# Patient Record
Sex: MALE | Race: WHITE | NOT HISPANIC OR LATINO | Employment: OTHER | URBAN - METROPOLITAN AREA
[De-identification: names, ages, dates, MRNs, and addresses within clinical notes are randomized per-mention and may not be internally consistent; named-entity substitution may affect disease eponyms.]

---

## 2021-02-01 DIAGNOSIS — M54.50 LOW BACK PAIN RADIATING TO RIGHT LEG: Primary | ICD-10-CM

## 2021-02-01 DIAGNOSIS — M79.604 LOW BACK PAIN RADIATING TO RIGHT LEG: Primary | ICD-10-CM

## 2022-02-21 ENCOUNTER — HOSPITAL ENCOUNTER (OUTPATIENT)
Facility: HOSPITAL | Age: 72
Discharge: NURSING, REHAB OR LONG-TERM CARE | End: 2022-02-24
Attending: EMERGENCY MEDICINE | Admitting: HOSPITALIST
Payer: MEDICARE

## 2022-02-21 ENCOUNTER — HOSPITAL ENCOUNTER (EMERGENCY)
Facility: HOSPITAL | Age: 72
Discharge: OTHER INSTITUTION | End: 2022-02-21
Attending: INTERNAL MEDICINE
Payer: MEDICARE

## 2022-02-21 VITALS
TEMPERATURE: 99 F | OXYGEN SATURATION: 98 % | HEART RATE: 96 BPM | RESPIRATION RATE: 20 BRPM | BODY MASS INDEX: 42.44 KG/M2 | HEIGHT: 68 IN | DIASTOLIC BLOOD PRESSURE: 72 MMHG | WEIGHT: 280 LBS | SYSTOLIC BLOOD PRESSURE: 137 MMHG
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
## 2022-02-21 DIAGNOSIS — S61.313A LACERATION OF LEFT MIDDLE FINGER WITHOUT FOREIGN BODY WITH DAMAGE TO NAIL, INITIAL ENCOUNTER: ICD-10-CM

## 2022-02-21 DIAGNOSIS — M25.559 HIP PAIN: ICD-10-CM

## 2022-02-21 DIAGNOSIS — S61.215A LACERATION OF LEFT RING FINGER, FOREIGN BODY PRESENCE UNSPECIFIED, NAIL DAMAGE STATUS UNSPECIFIED, INITIAL ENCOUNTER: Primary | ICD-10-CM

## 2022-02-21 DIAGNOSIS — S62.633B OPEN DISPLACED FRACTURE OF DISTAL PHALANX OF LEFT MIDDLE FINGER, INITIAL ENCOUNTER: Primary | ICD-10-CM

## 2022-02-21 DIAGNOSIS — S68.113A AMPUTATION OF LEFT MIDDLE FINGER: ICD-10-CM

## 2022-02-21 PROBLEM — S61.213A LACERATION OF LEFT MIDDLE FINGER WITHOUT FOREIGN BODY: Status: ACTIVE | Noted: 2022-02-21

## 2022-02-21 LAB
ALBUMIN SERPL BCP-MCNC: 2.9 G/DL (ref 3.5–5.2)
ALP SERPL-CCNC: 77 U/L (ref 55–135)
ALT SERPL W/O P-5'-P-CCNC: 33 U/L (ref 10–44)
ANION GAP SERPL CALC-SCNC: 14 MMOL/L (ref 8–16)
AST SERPL-CCNC: 88 U/L (ref 10–40)
BACTERIA #/AREA URNS HPF: ABNORMAL /HPF
BASOPHILS # BLD AUTO: 0.07 K/UL (ref 0–0.2)
BASOPHILS NFR BLD: 0.9 % (ref 0–1.9)
BILIRUB SERPL-MCNC: 3.4 MG/DL (ref 0.1–1)
BILIRUB UR QL STRIP: ABNORMAL
BUN SERPL-MCNC: 10 MG/DL (ref 8–23)
CALCIUM SERPL-MCNC: 8.4 MG/DL (ref 8.7–10.5)
CHLORIDE SERPL-SCNC: 110 MMOL/L (ref 95–110)
CK SERPL-CCNC: 344 U/L (ref 20–200)
CLARITY UR: CLEAR
CO2 SERPL-SCNC: 21 MMOL/L (ref 23–29)
COLOR UR: ABNORMAL
CREAT SERPL-MCNC: 0.9 MG/DL (ref 0.5–1.4)
DIFFERENTIAL METHOD: ABNORMAL
EOSINOPHIL # BLD AUTO: 0 K/UL (ref 0–0.5)
EOSINOPHIL NFR BLD: 0.1 % (ref 0–8)
ERYTHROCYTE [DISTWIDTH] IN BLOOD BY AUTOMATED COUNT: 13.2 % (ref 11.5–14.5)
EST. GFR  (AFRICAN AMERICAN): >60 ML/MIN/1.73 M^2
EST. GFR  (NON AFRICAN AMERICAN): >60 ML/MIN/1.73 M^2
GLUCOSE SERPL-MCNC: 126 MG/DL (ref 70–110)
GLUCOSE UR QL STRIP: NEGATIVE
HCT VFR BLD AUTO: 36.2 % (ref 40–54)
HGB BLD-MCNC: 12.4 G/DL (ref 14–18)
HGB UR QL STRIP: ABNORMAL
HYALINE CASTS #/AREA URNS LPF: 2 /LPF
IMM GRANULOCYTES # BLD AUTO: 0.02 K/UL (ref 0–0.04)
IMM GRANULOCYTES NFR BLD AUTO: 0.2 % (ref 0–0.5)
KETONES UR QL STRIP: NEGATIVE
LEUKOCYTE ESTERASE UR QL STRIP: NEGATIVE
LYMPHOCYTES # BLD AUTO: 0.6 K/UL (ref 1–4.8)
LYMPHOCYTES NFR BLD: 7.3 % (ref 18–48)
MAGNESIUM SERPL-MCNC: 1.8 MG/DL (ref 1.6–2.6)
MCH RBC QN AUTO: 35.8 PG (ref 27–31)
MCHC RBC AUTO-ENTMCNC: 34.3 G/DL (ref 32–36)
MCV RBC AUTO: 105 FL (ref 82–98)
MICROSCOPIC COMMENT: ABNORMAL
MONOCYTES # BLD AUTO: 0.5 K/UL (ref 0.3–1)
MONOCYTES NFR BLD: 6.7 % (ref 4–15)
NEUTROPHILS # BLD AUTO: 6.8 K/UL (ref 1.8–7.7)
NEUTROPHILS NFR BLD: 84.8 % (ref 38–73)
NITRITE UR QL STRIP: NEGATIVE
NRBC BLD-RTO: 0 /100 WBC
PH UR STRIP: 7 [PH] (ref 5–8)
PLATELET # BLD AUTO: 59 K/UL (ref 150–450)
PMV BLD AUTO: 10.7 FL (ref 9.2–12.9)
POTASSIUM SERPL-SCNC: 4.1 MMOL/L (ref 3.5–5.1)
PROT SERPL-MCNC: 6.8 G/DL (ref 6–8.4)
PROT UR QL STRIP: ABNORMAL
RBC # BLD AUTO: 3.46 M/UL (ref 4.6–6.2)
RBC #/AREA URNS HPF: 1 /HPF (ref 0–4)
SARS-COV-2 RDRP RESP QL NAA+PROBE: NEGATIVE
SODIUM SERPL-SCNC: 145 MMOL/L (ref 136–145)
SP GR UR STRIP: 1.01 (ref 1–1.03)
SQUAMOUS #/AREA URNS HPF: 3 /HPF
URN SPEC COLLECT METH UR: ABNORMAL
UROBILINOGEN UR STRIP-ACNC: ABNORMAL EU/DL
WBC # BLD AUTO: 8.04 K/UL (ref 3.9–12.7)
WBC #/AREA URNS HPF: 1 /HPF (ref 0–5)

## 2022-02-21 PROCEDURE — 70450 CT HEAD/BRAIN W/O DYE: CPT | Mod: TC

## 2022-02-21 PROCEDURE — 96365 THER/PROPH/DIAG IV INF INIT: CPT

## 2022-02-21 PROCEDURE — U0002 COVID-19 LAB TEST NON-CDC: HCPCS | Performed by: INTERNAL MEDICINE

## 2022-02-21 PROCEDURE — 25000003 PHARM REV CODE 250: Performed by: STUDENT IN AN ORGANIZED HEALTH CARE EDUCATION/TRAINING PROGRAM

## 2022-02-21 PROCEDURE — 72125 CT CERVICAL SPINE WITHOUT CONTRAST: ICD-10-PCS | Mod: 26,,, | Performed by: RADIOLOGY

## 2022-02-21 PROCEDURE — 63600175 PHARM REV CODE 636 W HCPCS: Performed by: INTERNAL MEDICINE

## 2022-02-21 PROCEDURE — 82550 ASSAY OF CK (CPK): CPT

## 2022-02-21 PROCEDURE — 72125 CT NECK SPINE W/O DYE: CPT | Mod: 26,,, | Performed by: RADIOLOGY

## 2022-02-21 PROCEDURE — 71045 XR CHEST AP PORTABLE: ICD-10-PCS | Mod: 26,,, | Performed by: RADIOLOGY

## 2022-02-21 PROCEDURE — 85025 COMPLETE CBC W/AUTO DIFF WBC: CPT | Performed by: INTERNAL MEDICINE

## 2022-02-21 PROCEDURE — 93005 ELECTROCARDIOGRAM TRACING: CPT

## 2022-02-21 PROCEDURE — 99285 EMERGENCY DEPT VISIT HI MDM: CPT | Mod: 25

## 2022-02-21 PROCEDURE — 73130 XR HAND COMPLETE 3 VIEW LEFT: ICD-10-PCS | Mod: 26,LT,, | Performed by: RADIOLOGY

## 2022-02-21 PROCEDURE — 72125 CT NECK SPINE W/O DYE: CPT | Mod: TC

## 2022-02-21 PROCEDURE — 12002 RPR S/N/AX/GEN/TRNK2.6-7.5CM: CPT

## 2022-02-21 PROCEDURE — 36415 COLL VENOUS BLD VENIPUNCTURE: CPT | Performed by: INTERNAL MEDICINE

## 2022-02-21 PROCEDURE — 80053 COMPREHEN METABOLIC PANEL: CPT | Performed by: INTERNAL MEDICINE

## 2022-02-21 PROCEDURE — 63600175 PHARM REV CODE 636 W HCPCS: Performed by: STUDENT IN AN ORGANIZED HEALTH CARE EDUCATION/TRAINING PROGRAM

## 2022-02-21 PROCEDURE — 93010 ELECTROCARDIOGRAM REPORT: CPT | Mod: ,,, | Performed by: INTERNAL MEDICINE

## 2022-02-21 PROCEDURE — 81000 URINALYSIS NONAUTO W/SCOPE: CPT | Performed by: INTERNAL MEDICINE

## 2022-02-21 PROCEDURE — 96365 THER/PROPH/DIAG IV INF INIT: CPT | Mod: 59

## 2022-02-21 PROCEDURE — 25000003 PHARM REV CODE 250

## 2022-02-21 PROCEDURE — 11730 AVULSION NAIL PLATE SIMPLE 1: CPT | Mod: F2

## 2022-02-21 PROCEDURE — 71045 X-RAY EXAM CHEST 1 VIEW: CPT | Mod: 26,,, | Performed by: RADIOLOGY

## 2022-02-21 PROCEDURE — 25000003 PHARM REV CODE 250: Performed by: INTERNAL MEDICINE

## 2022-02-21 PROCEDURE — 83735 ASSAY OF MAGNESIUM: CPT | Performed by: INTERNAL MEDICINE

## 2022-02-21 PROCEDURE — 70450 CT HEAD/BRAIN W/O DYE: CPT | Mod: 26,,, | Performed by: RADIOLOGY

## 2022-02-21 PROCEDURE — 99285 EMERGENCY DEPT VISIT HI MDM: CPT | Mod: 25,27

## 2022-02-21 PROCEDURE — 71045 X-RAY EXAM CHEST 1 VIEW: CPT | Mod: TC,FY

## 2022-02-21 PROCEDURE — 63600175 PHARM REV CODE 636 W HCPCS

## 2022-02-21 PROCEDURE — 87040 BLOOD CULTURE FOR BACTERIA: CPT | Performed by: INTERNAL MEDICINE

## 2022-02-21 PROCEDURE — 99285 PR EMERGENCY DEPT VISIT,LEVEL V: ICD-10-PCS | Mod: ,,, | Performed by: EMERGENCY MEDICINE

## 2022-02-21 PROCEDURE — 73130 X-RAY EXAM OF HAND: CPT | Mod: TC,FY,LT

## 2022-02-21 PROCEDURE — G0378 HOSPITAL OBSERVATION PER HR: HCPCS

## 2022-02-21 PROCEDURE — 96375 TX/PRO/DX INJ NEW DRUG ADDON: CPT

## 2022-02-21 PROCEDURE — 96361 HYDRATE IV INFUSION ADD-ON: CPT

## 2022-02-21 PROCEDURE — 70450 CT HEAD WITHOUT CONTRAST: ICD-10-PCS | Mod: 26,,, | Performed by: RADIOLOGY

## 2022-02-21 PROCEDURE — 94760 N-INVAS EAR/PLS OXIMETRY 1: CPT

## 2022-02-21 PROCEDURE — 99285 EMERGENCY DEPT VISIT HI MDM: CPT | Mod: ,,, | Performed by: EMERGENCY MEDICINE

## 2022-02-21 PROCEDURE — 73130 X-RAY EXAM OF HAND: CPT | Mod: 26,LT,, | Performed by: RADIOLOGY

## 2022-02-21 PROCEDURE — 93010 EKG 12-LEAD: ICD-10-PCS | Mod: ,,, | Performed by: INTERNAL MEDICINE

## 2022-02-21 RX ORDER — CEFAZOLIN SODIUM 1 G/50ML
1 SOLUTION INTRAVENOUS
Status: COMPLETED | OUTPATIENT
Start: 2022-02-21 | End: 2022-02-21

## 2022-02-21 RX ORDER — LIDOCAINE HYDROCHLORIDE 10 MG/ML
20 INJECTION INFILTRATION; PERINEURAL ONCE
Status: COMPLETED | OUTPATIENT
Start: 2022-02-21 | End: 2022-02-21

## 2022-02-21 RX ORDER — CEFAZOLIN SODIUM 1 G/3ML
1 INJECTION, POWDER, FOR SOLUTION INTRAMUSCULAR; INTRAVENOUS
Status: DISCONTINUED | OUTPATIENT
Start: 2022-02-21 | End: 2022-02-21

## 2022-02-21 RX ORDER — ONDANSETRON 2 MG/ML
4 INJECTION INTRAMUSCULAR; INTRAVENOUS
Status: COMPLETED | OUTPATIENT
Start: 2022-02-21 | End: 2022-02-21

## 2022-02-21 RX ORDER — MORPHINE SULFATE 4 MG/ML
2 INJECTION, SOLUTION INTRAMUSCULAR; INTRAVENOUS
Status: COMPLETED | OUTPATIENT
Start: 2022-02-21 | End: 2022-02-21

## 2022-02-21 RX ORDER — SULFAMETHOXAZOLE AND TRIMETHOPRIM 800; 160 MG/1; MG/1
1 TABLET ORAL 2 TIMES DAILY
Qty: 28 TABLET | Refills: 0 | Status: SHIPPED | OUTPATIENT
Start: 2022-02-21 | End: 2022-02-24 | Stop reason: SDUPTHER

## 2022-02-21 RX ORDER — DIAZEPAM 5 MG/1
5 TABLET ORAL
Status: COMPLETED | OUTPATIENT
Start: 2022-02-21 | End: 2022-02-21

## 2022-02-21 RX ADMIN — SODIUM CHLORIDE, SODIUM LACTATE, POTASSIUM CHLORIDE, AND CALCIUM CHLORIDE 1000 ML: .6; .31; .03; .02 INJECTION, SOLUTION INTRAVENOUS at 10:02

## 2022-02-21 RX ADMIN — BACITRACIN, NEOMYCIN, POLYMYXIN B 1 EACH: 400; 3.5; 5 OINTMENT TOPICAL at 01:02

## 2022-02-21 RX ADMIN — CEFAZOLIN SODIUM 1 G: 1 SOLUTION INTRAVENOUS at 05:02

## 2022-02-21 RX ADMIN — CEFAZOLIN SODIUM 1 G: 1 SOLUTION INTRAVENOUS at 02:02

## 2022-02-21 RX ADMIN — ONDANSETRON 4 MG: 2 INJECTION INTRAMUSCULAR; INTRAVENOUS at 03:02

## 2022-02-21 RX ADMIN — LIDOCAINE HYDROCHLORIDE 20 ML: 10 INJECTION, SOLUTION INFILTRATION; PERINEURAL at 06:02

## 2022-02-21 RX ADMIN — MORPHINE SULFATE 2 MG: 4 INJECTION INTRAVENOUS at 03:02

## 2022-02-21 RX ADMIN — DIAZEPAM 5 MG: 5 TABLET ORAL at 07:02

## 2022-02-21 NOTE — ED NOTES
Patient arrives from another [facility] hospital with CC of fall. Patient had a fall last night and was unable to get up for several hours. Patient is complaining of back pain as well as has an injury to the middle finger of the left hand.

## 2022-02-21 NOTE — ED PROVIDER NOTES
Encounter Date: 2/21/2022     History     Chief Complaint   Patient presents with    Transfer     From [facility] for hand laceration.      71 year old male with HTN transferred from [place] ED for left hand laceration. Pt states he got up early this morning to go floundering when he tripped over a chair while walking through his kitchen. States his left hand when through the chair which is held together by metal pieces and nails. Cut his finger on the nails/chair fragments. Reports he was unable to get himself off of the floor after a couple of attempts so laid there for 7 hours. Eventually realized his phone was within reach, at which point he called his neighbor who called EMS. Denies headstrike or LOC. Denies chest pain, SOB, or palpitations prior to the fall. Denies lightheadedness/dizziness prior to falling. Denies abdominal pain, nausea, or vomiting. Denies fevers or infective symptoms. States he only drinks socially, however he has had visitors recently so has been drinking 4x beers daily for 1.5 weeks. Transferred for review by orthopedics/hand surgeons.         Review of patient's allergies indicates:  No Known Allergies  Past Medical History:   Diagnosis Date    Herniated lumbar intervertebral disc     Hypertension      No past surgical history on file.  No family history on file.  Social History     Tobacco Use    Smoking status: Never Smoker    Smokeless tobacco: Never Used   Substance Use Topics    Alcohol use: Yes    Drug use: Never     Review of Systems   Constitutional: Negative for fatigue and fever.   HENT: Negative.    Eyes: Negative.    Respiratory: Negative for cough, shortness of breath and wheezing.    Cardiovascular: Negative for chest pain and palpitations.   Gastrointestinal: Negative for abdominal pain, nausea and vomiting.   Genitourinary: Negative for dysuria and hematuria.   Skin: Positive for wound.   Neurological: Positive for tremors and weakness (generalized). Negative for  dizziness, light-headedness and headaches.   Psychiatric/Behavioral: The patient is nervous/anxious.        Physical Exam     Initial Vitals [02/21/22 1656]   BP Pulse Resp Temp SpO2   136/66 96 16 98.5 °F (36.9 °C) 100 %      MAP       --         Physical Exam    Nursing note and vitals reviewed.  Constitutional: He appears well-developed.   HENT:   Head: Normocephalic and atraumatic.   Eyes: EOM are normal. Pupils are equal, round, and reactive to light.   Cardiovascular: Normal rate and regular rhythm.   Murmur (ejection systolic murmur) heard.  Pulmonary/Chest: He has no wheezes. He has no rhonchi. He has no rales.   Abdominal: Abdomen is soft. Bowel sounds are normal. He exhibits no distension. There is no abdominal tenderness.   Musculoskeletal:         General: Edema (BL LL to ankles) present. No tenderness.      Left hand: Deformity and laceration present. Normal range of motion. Normal sensation. Normal capillary refill. Normal pulse.      Comments: Wound to tip of 3rd digit of left hand  Amputation of pulp of finger  No active bleeding  Sensation intact  Power and tendons intact  Normal pulse and cap refill     Neurological: He is alert and oriented to person, place, and time. He has normal strength and normal reflexes. He displays tremor. No cranial nerve deficit or sensory deficit. GCS eye subscore is 4. GCS verbal subscore is 5. GCS motor subscore is 6.   Skin: Skin is warm. Capillary refill takes less than 2 seconds.       ED Course   Procedures  Labs Reviewed   CK - Abnormal; Notable for the following components:       Result Value     (*)     All other components within normal limits          Imaging Results    None          Medications   ceFAZolin (ANCEF) 1 gram in dextrose 5 % 50 mL IVPB (premix) (0 g Intravenous Stopped 2/21/22 1851)   LIDOcaine HCL 10 mg/ml (1%) injection 20 mL (20 mLs Other Given 2/21/22 1815)   diazePAM tablet 5 mg (5 mg Oral Given 2/21/22 1942)     Medical Decision  Making:   History:   Old Medical Records: I decided to obtain old medical records.  Differential Diagnosis:   Traumatic amputation of tip of 3rd digit  Alcohol withdrawal  Femur fracture  Clinical Tests:   Lab Tests: Reviewed  The following lab test(s) were unremarkable: CBC, CMP and Urinalysis  Radiological Study: Reviewed  Medical Tests: Reviewed  ED Management:  72 yo M transferred from [facility] for review by orthopedics of the laceration on his left hand. Reports he tripped over a chair in his kitchen early this morning when he was trying to go floundering. Denies headstrike or LOC. Was unable to stand back up after several attempts so laid on the floor for 7-8 hours. Eventually realized his phone was within reach so called for help. Hemodynamically stable on arrival to ED. Alert and oriented x3. Pt reports feeling nervous. Exam remarkable for amputation of pulp of 3rd digit on left hand; pulses intact, normal sensation and ROM, tendons intact. No active bleeding. Pt does not have the tip of his finger, was unable to find it. Lab work completed at [facility] unremarkable. Normal renal function. UA remarkable for occult blood and urobiliogen, no CPK completed. Will obtain CPK to exclude rhabdomyolysis - mildly elevated 344.  CT head and c-spine unremarkable. XR left hand remarkable for undisplaced fracture. Given tetanus booster at outside hospital. Orthopedics consulted for review of traumatic amputation of tip of left digit. Orthopedics has cleaned the wound, dressed it, and casted his hand. Pt admits to drinking about 4 beers daily for the past 1.5 weeks (had people visiting). Normally does not drink daily. He is feeling anxious and tremulous. Given diazepam 5mg PO for symptom management. Given a dose of IV abx, 1L LR bolus, and tylenol for analgesia. Nursing staff and myself attempted to ambulate with the patient with a walker however he was unable to do so. PT has already left so unable to request a  PT review. Examine remarkable for pain on movement of left leg - XR pelvis ordered to exclude fracture. No fracture seen on x-ray. Disposition is admission for management of potential alcohol withdrawal and physical therapy review due to difficulty with ambulation. On discharge, he will need orthopedic clinic follow-up and will need to complete a 14 day course of bactrim DS BID (prescription already sent to his pharmacy).   Other:   I discussed test(s) with the performing physician.  I have discussed this case with another health care provider.                      Clinical Impression:   Final diagnoses:  [J21.292J] Laceration of left ring finger, foreign body presence unspecified, nail damage status unspecified, initial encounter (Primary)          ED Disposition Condition    Discharge Stable        ED Prescriptions     None        Follow-up Information    None          La Lux MD  Resident  02/22/22 0021

## 2022-02-21 NOTE — ED PROVIDER NOTES
Encounter Date: 2/21/2022       History     Chief Complaint   Patient presents with    Fall     Patient states he fell last, tripped on a chair, at midnight and was on the floor until EMS picked him up.  Patient states he cut his third digit on his left hand.     THE PATIENT STATES HE GOT UP AROUND MIDNIGHT LAST NIGHT TO GET A GLASS OF WATER, HE TRIPPED OVER CHAIR THAT WAS IN THE FLOOR AND FELL DOWN.  HIS LEFT HAND HIT THE CHAIR AND THERE WAS A NAIL ON IT AND IT RIPPED OFF THE LEFT MIDDLE DISTAL FINGER.  THE PATIENT STATES HE WAS UNABLE TO GET UP ON HIS OWN.  HE LAID ON THE FLOOR BUT DENIED ANY LOSS OF CONSCIOUSNESS.  HE STATES HE DID HAVE A BOWEL MOVEMENT BUT HE WAS CONSCIOUS AS WELL.  HE STATES HE HAS BEEN WEAK FOR SEVERAL WEEKS NOW BUT NO DEFINITE ETIOLOGY.        Review of patient's allergies indicates:  No Known Allergies  Past Medical History:   Diagnosis Date    Herniated lumbar intervertebral disc     Hypertension      History reviewed. No pertinent surgical history.  History reviewed. No pertinent family history.  Social History     Tobacco Use    Smoking status: Never Smoker    Smokeless tobacco: Never Used   Substance Use Topics    Alcohol use: Yes    Drug use: Never     Review of Systems   Constitutional: Negative for fever.   HENT: Negative for sore throat.    Respiratory: Negative for shortness of breath.    Cardiovascular: Negative for chest pain.   Gastrointestinal: Negative for nausea.   Genitourinary: Negative for dysuria.   Musculoskeletal: Negative for back pain.   Skin: Negative for rash.   Neurological: Negative for weakness.   Hematological: Does not bruise/bleed easily.       Physical Exam     Initial Vitals [02/21/22 1000]   BP Pulse Resp Temp SpO2   112/61 89 20 98.9 °F (37.2 °C) 98 %      MAP       --         Physical Exam    Constitutional:   ALERT AWARE NO ACUTE DISTRESS APPROPRIATE   Eyes: Conjunctivae and EOM are normal. Pupils are equal, round, and reactive to light.   Neck:  Trachea normal. Neck supple.   Normal range of motion.  Cardiovascular: Regular rhythm, normal heart sounds and normal pulses.   Pulmonary/Chest: Effort normal and breath sounds normal.   Abdominal: Abdomen is soft and flat. Bowel sounds are normal. There is no abdominal tenderness.   Musculoskeletal:      Cervical back: Normal range of motion and neck supple.      Comments: THERE IS COMPLETE AMPUTATION OF THE DISTAL LEFT MIDDLE FINGER.     Neurological: He is alert. He has normal strength and normal reflexes. No cranial nerve deficit or sensory deficit. GCS eye subscore is 4. GCS verbal subscore is 5. GCS motor subscore is 6.   Skin:        DRY ERYTHEMATOUS CHRONIC SKIN CHANGES ON BOTH ANTERIOR SHINS.  NEUROVASCULAR IS AND MOTOR DISTALLY IS NORMAL         ED Course   Procedures  Labs Reviewed   CBC W/ AUTO DIFFERENTIAL - Abnormal; Notable for the following components:       Result Value    RBC 3.46 (*)     Hemoglobin 12.4 (*)     Hematocrit 36.2 (*)      (*)     MCH 35.8 (*)     Platelets 59 (*)     Lymph # 0.6 (*)     Gran % 84.8 (*)     Lymph % 7.3 (*)     All other components within normal limits   COMPREHENSIVE METABOLIC PANEL - Abnormal; Notable for the following components:    CO2 21 (*)     Glucose 126 (*)     Calcium 8.4 (*)     Albumin 2.9 (*)     Total Bilirubin 3.4 (*)     AST 88 (*)     All other components within normal limits   URINALYSIS, REFLEX TO URINE CULTURE - Abnormal; Notable for the following components:    Protein, UA 1+ (*)     Bilirubin (UA) 1+ (*)     Occult Blood UA 1+ (*)     Urobilinogen, UA 2.0-3.0 (*)     All other components within normal limits    Narrative:     Preferred Collection Type->Urine, Clean Catch  Specimen Source->Urine   URINALYSIS MICROSCOPIC - Abnormal; Notable for the following components:    Bacteria Moderate (*)     Hyaline Casts, UA 2 (*)     All other components within normal limits    Narrative:     Preferred Collection Type->Urine, Clean Catch  Specimen  Source->Urine   CULTURE, BLOOD   MAGNESIUM   SARS-COV-2 RNA AMPLIFICATION, QUAL    Narrative:     Is the patient symptomatic?->No        ECG Results          EKG 12-lead (Preliminary result)  Result time 02/21/22 10:49:16    ED Interpretation by Wilfred Johnson MD (02/21/22 10:49:16, [facility], Emergency Medicine)    NORMAL SINUS RHYTHM WITH A RATE OF 91, NO ACUTE ISCHEMIC CHANGES                  In process by Interface, Lab In [facility] (02/21/22 10:48:10)                 Narrative:    Test Reason : W10.8XXA,    Vent. Rate : 091 BPM     Atrial Rate : 091 BPM     P-R Int : 168 ms          QRS Dur : 086 ms      QT Int : 382 ms       P-R-T Axes : 053 -03 057 degrees     QTc Int : 469 ms    Normal sinus rhythm  Normal ECG  No previous ECGs available    Referred By: [email or web address]   SELF           Confirmed By:                             Imaging Results          CT Cervical Spine Without Contrast (Final result)  Result time 02/21/22 12:11:22    Final result by Colin Gregory MD (02/21/22 12:11:22)                 Impression:      1. No acute fracture or subluxation.  2. Mild to moderate multilevel degenerative disc disease resulting in mild central spinal canal stenosis with bilateral neural foraminal narrowing.  3. Suspected right pleural effusion.      Electronically signed by: Colin Gregory  Date:    02/21/2022  Time:    12:11             Narrative:    EXAMINATION:  CT CERVICAL SPINE WITHOUT CONTRAST    CLINICAL HISTORY:  FALL;    TECHNIQUE:  Low dose axial images, sagittal and coronal reformations were performed though the cervical spine.  Contrast was not administered.    COMPARISON:  None    FINDINGS:  The cervical vertebral bodies are normal in height and alignment.  No acute fracture or subluxation.  No significant prevertebral soft tissue swelling.    There is mild to moderate multilevel degenerative disc disease with disc space narrowing and osteophyte formation.  This is most pronounced within  the lower cervical spine resulting in mild central spinal canal stenosis with bilateral neural foraminal narrowing.    Suspected right pleural effusion.  Lung apices otherwise clear.                               CT Head Without Contrast (Final result)  Result time 02/21/22 12:04:45    Final result by Colin Gregory MD (02/21/22 12:04:45)                 Impression:      1. Cortical atrophy with periventricular deep white matter change consistent with chronic small vessel ischemic disease.      Electronically signed by: Colin Gregory  Date:    02/21/2022  Time:    12:04             Narrative:    EXAMINATION:  CT HEAD WITHOUT CONTRAST    CLINICAL HISTORY:  FALL;    TECHNIQUE:  Low dose axial images were obtained through the head.  Coronal and sagittal reformations were also performed. Contrast was not administered.    COMPARISON:  None.    FINDINGS:  There is no acute hemorrhage or infarction.  There is cortical atrophy.  There are periventricular deep white matter changes consistent with chronic small vessel ischemic disease.    No extra-axial fluid collections.  Ventricles are normal in size, shape and configuration.  The basal cisterns are patent.    The imaged paranasal sinuses and ethmoid air cells are well aerated.    The mastoid air cells and middle ears are normally pneumatized.                               X-Ray Hand 3 view Left (Final result)  Result time 02/21/22 11:09:29    Final result by Colin Gregory MD (02/21/22 11:09:29)                 Impression:      1. Soft tissue laceration/partial amputation of the distal 3rd digit with a suspected small adjacent radiopaque foreign body.  2. Nondisplaced fracture involving the distal tuft of the 3rd digit.      Electronically signed by: Colin Gregory  Date:    02/21/2022  Time:    11:09             Narrative:    EXAMINATION:  XR HAND COMPLETE 3 VIEW LEFT    CLINICAL HISTORY:  TRAUMA;.    TECHNIQUE:  PA, lateral, and oblique views of the left hand  were performed.    COMPARISON:  None    FINDINGS:  Soft tissue laceration/partial amputation of the distal 3rd digit.  There is a small adjacent 4 mm radiopaque foreign body.  There is a nondisplaced oblique fracture involving the distal tuft of the distal phalanx of the 3rd digit.    There are mild-to-moderate chronic changes of degenerative osteoarthrosis most predominant at the 1st CMC joint.  Distal radius and ulna intact.                               X-Ray Chest AP Portable (Final result)  Result time 02/21/22 11:06:38    Final result by Colin Gregory MD (02/21/22 11:06:38)                 Impression:      Mild chronic interstitial change without focal consolidation.      Electronically signed by: Colin Gregory  Date:    02/21/2022  Time:    11:06             Narrative:    EXAMINATION:  XR CHEST AP PORTABLE    CLINICAL HISTORY:  Sepsis;    TECHNIQUE:  Single frontal view of the chest was performed.    COMPARISON:  None    FINDINGS:  Mild pulmonary hypoinflation mild crowding of bronchopulmonary vessels.  Mild chronic interstitial change.  No focal consolidation    Heart size is top-normal.  Mediastinal contours unremarkable.  Trachea midline.    Bony thorax intact.                                 Medications   morphine injection 2 mg (has no administration in time range)   ondansetron injection 4 mg (has no administration in time range)   neomycin-bacitracnZn-polymyxnB packet (1 each Topical (Top) Given 2/21/22 1359)   ceFAZolin (ANCEF) 1 gram in dextrose 5 % 50 mL IVPB (premix) (0 g Intravenous Stopped 2/21/22 1444)                 ED Course as of 02/21/22 1519   Mon Feb 21, 2022   1049 EKG 12-lead [PW]   1123 X-Ray Hand 3 view Left [PW]   1123 X-Ray Chest AP Portable [PW]   1205 Patient had no new complaints, GCS and neurologic exam normal [PW]   1247 CT Cervical Spine Without Contrast [PW]   1247 CT Head Without Contrast [PW]   1317 LAB STILL HAS NOT BEEN COLLECTED FOR PATIENT [PW]   1337 X-Ray Hand 3  view Left [PW]   1337 WILL REFER PATIENT TO THE [facility] TRANSFER LINE, THEY WAS SET UP AN OUTPATIENT APPOINTMENT WITH HAND OR PLASTICS [PW]   1401 CBC auto differential(!) [PW]   1401 Urinalysis Microscopic(!) [PW]   1408 ACCEPTED FOR TRANSFER TO [facility] BY DR CASTILLO [facility]. [PW]   1422 Magnesium: 1.8 [PW]   1422 Comprehensive metabolic panel(!)  PATIENT EMS TO [facility] WANTS TO GO BY  EMERGENCY ROOM [PW]      ED Course User Index  [PW] Wilfred Johnson MD             Clinical Impression:   Final diagnoses:  [W10.8XXA] Fall (on) (from) other stairs and steps, initial encounter  [S68.113A] Amputation of left middle finger  [S62.633B] Open displaced fracture of distal phalanx of left middle finger, initial encounter (Primary)          ED Disposition Condition    Transfer to Another Facility Stable              Wilfred Johnson MD  02/21/22 1423       Wilfred Johnson MD  02/21/22 1984

## 2022-02-21 NOTE — ED NOTES
Pt loaded on amr stretcher en route to [facility] [place].

## 2022-02-22 DIAGNOSIS — R52 PAIN: Primary | ICD-10-CM

## 2022-02-22 PROBLEM — I10 HTN (HYPERTENSION): Status: ACTIVE | Noted: 2022-02-22

## 2022-02-22 PROBLEM — Z78.9 ALCOHOL USE: Status: ACTIVE | Noted: 2022-02-22

## 2022-02-22 LAB
ALBUMIN SERPL BCP-MCNC: 2.7 G/DL (ref 3.5–5.2)
ALP SERPL-CCNC: 78 U/L (ref 55–135)
ALT SERPL W/O P-5'-P-CCNC: 26 U/L (ref 10–44)
ANION GAP SERPL CALC-SCNC: 10 MMOL/L (ref 8–16)
AST SERPL-CCNC: 81 U/L (ref 10–40)
BASOPHILS # BLD AUTO: 0.04 K/UL (ref 0–0.2)
BASOPHILS NFR BLD: 0.5 % (ref 0–1.9)
BILIRUB SERPL-MCNC: 4 MG/DL (ref 0.1–1)
BUN SERPL-MCNC: 17 MG/DL (ref 8–23)
CALCIUM SERPL-MCNC: 8.4 MG/DL (ref 8.7–10.5)
CHLORIDE SERPL-SCNC: 107 MMOL/L (ref 95–110)
CO2 SERPL-SCNC: 25 MMOL/L (ref 23–29)
CREAT SERPL-MCNC: 0.9 MG/DL (ref 0.5–1.4)
DIFFERENTIAL METHOD: ABNORMAL
EOSINOPHIL # BLD AUTO: 0 K/UL (ref 0–0.5)
EOSINOPHIL NFR BLD: 0.3 % (ref 0–8)
ERYTHROCYTE [DISTWIDTH] IN BLOOD BY AUTOMATED COUNT: 13.5 % (ref 11.5–14.5)
EST. GFR  (AFRICAN AMERICAN): >60 ML/MIN/1.73 M^2
EST. GFR  (NON AFRICAN AMERICAN): >60 ML/MIN/1.73 M^2
GLUCOSE SERPL-MCNC: 130 MG/DL (ref 70–110)
HCT VFR BLD AUTO: 31.6 % (ref 40–54)
HGB BLD-MCNC: 10.9 G/DL (ref 14–18)
IMM GRANULOCYTES # BLD AUTO: 0.03 K/UL (ref 0–0.04)
IMM GRANULOCYTES NFR BLD AUTO: 0.4 % (ref 0–0.5)
LYMPHOCYTES # BLD AUTO: 1 K/UL (ref 1–4.8)
LYMPHOCYTES NFR BLD: 12.9 % (ref 18–48)
MAGNESIUM SERPL-MCNC: 1.8 MG/DL (ref 1.6–2.6)
MCH RBC QN AUTO: 36.7 PG (ref 27–31)
MCHC RBC AUTO-ENTMCNC: 34.5 G/DL (ref 32–36)
MCV RBC AUTO: 106 FL (ref 82–98)
MONOCYTES # BLD AUTO: 1 K/UL (ref 0.3–1)
MONOCYTES NFR BLD: 12.1 % (ref 4–15)
NEUTROPHILS # BLD AUTO: 5.8 K/UL (ref 1.8–7.7)
NEUTROPHILS NFR BLD: 73.8 % (ref 38–73)
NRBC BLD-RTO: 0 /100 WBC
PHOSPHATE SERPL-MCNC: 3.2 MG/DL (ref 2.7–4.5)
PLATELET # BLD AUTO: 40 K/UL (ref 150–450)
PMV BLD AUTO: 11.8 FL (ref 9.2–12.9)
POTASSIUM SERPL-SCNC: 3.8 MMOL/L (ref 3.5–5.1)
PROT SERPL-MCNC: 6.5 G/DL (ref 6–8.4)
RBC # BLD AUTO: 2.97 M/UL (ref 4.6–6.2)
SODIUM SERPL-SCNC: 142 MMOL/L (ref 136–145)
TSH SERPL DL<=0.005 MIU/L-ACNC: 2.19 UIU/ML (ref 0.4–4)
WBC # BLD AUTO: 7.88 K/UL (ref 3.9–12.7)

## 2022-02-22 PROCEDURE — G0378 HOSPITAL OBSERVATION PER HR: HCPCS

## 2022-02-22 PROCEDURE — 84100 ASSAY OF PHOSPHORUS: CPT | Performed by: INTERNAL MEDICINE

## 2022-02-22 PROCEDURE — 25000003 PHARM REV CODE 250

## 2022-02-22 PROCEDURE — 99219 PR INITIAL OBSERVATION CARE,LEVL II: CPT | Mod: ,,, | Performed by: INTERNAL MEDICINE

## 2022-02-22 PROCEDURE — 96361 HYDRATE IV INFUSION ADD-ON: CPT

## 2022-02-22 PROCEDURE — 97165 OT EVAL LOW COMPLEX 30 MIN: CPT

## 2022-02-22 PROCEDURE — 85025 COMPLETE CBC W/AUTO DIFF WBC: CPT | Performed by: INTERNAL MEDICINE

## 2022-02-22 PROCEDURE — 63600175 PHARM REV CODE 636 W HCPCS: Performed by: INTERNAL MEDICINE

## 2022-02-22 PROCEDURE — 25000003 PHARM REV CODE 250: Performed by: STUDENT IN AN ORGANIZED HEALTH CARE EDUCATION/TRAINING PROGRAM

## 2022-02-22 PROCEDURE — 99219 PR INITIAL OBSERVATION CARE,LEVL II: ICD-10-PCS | Mod: ,,, | Performed by: INTERNAL MEDICINE

## 2022-02-22 PROCEDURE — 80053 COMPREHEN METABOLIC PANEL: CPT | Performed by: INTERNAL MEDICINE

## 2022-02-22 PROCEDURE — 83735 ASSAY OF MAGNESIUM: CPT | Performed by: INTERNAL MEDICINE

## 2022-02-22 PROCEDURE — 97530 THERAPEUTIC ACTIVITIES: CPT

## 2022-02-22 PROCEDURE — 84443 ASSAY THYROID STIM HORMONE: CPT | Performed by: INTERNAL MEDICINE

## 2022-02-22 PROCEDURE — 25000003 PHARM REV CODE 250: Performed by: INTERNAL MEDICINE

## 2022-02-22 PROCEDURE — 97161 PT EVAL LOW COMPLEX 20 MIN: CPT

## 2022-02-22 RX ORDER — ACETAMINOPHEN 325 MG/1
650 TABLET ORAL EVERY 6 HOURS PRN
Status: DISCONTINUED | OUTPATIENT
Start: 2022-02-22 | End: 2022-02-24 | Stop reason: HOSPADM

## 2022-02-22 RX ORDER — LISINOPRIL 20 MG/1
40 TABLET ORAL DAILY
Status: DISCONTINUED | OUTPATIENT
Start: 2022-02-22 | End: 2022-02-24 | Stop reason: HOSPADM

## 2022-02-22 RX ORDER — ASPIRIN 81 MG/1
81 TABLET ORAL DAILY
COMMUNITY
End: 2022-02-24

## 2022-02-22 RX ORDER — NALOXONE HCL 0.4 MG/ML
0.02 VIAL (ML) INJECTION
Status: DISCONTINUED | OUTPATIENT
Start: 2022-02-22 | End: 2022-02-24 | Stop reason: HOSPADM

## 2022-02-22 RX ORDER — THIAMINE HCL 100 MG
100 TABLET ORAL DAILY
Status: DISCONTINUED | OUTPATIENT
Start: 2022-02-22 | End: 2022-02-24 | Stop reason: HOSPADM

## 2022-02-22 RX ORDER — DIAZEPAM 5 MG/1
5 TABLET ORAL EVERY 8 HOURS PRN
Status: DISCONTINUED | OUTPATIENT
Start: 2022-02-22 | End: 2022-02-23

## 2022-02-22 RX ORDER — ACETAMINOPHEN 500 MG
1000 TABLET ORAL
Status: COMPLETED | OUTPATIENT
Start: 2022-02-22 | End: 2022-02-22

## 2022-02-22 RX ORDER — SULFAMETHOXAZOLE AND TRIMETHOPRIM 800; 160 MG/1; MG/1
1 TABLET ORAL 2 TIMES DAILY
Status: DISCONTINUED | OUTPATIENT
Start: 2022-02-22 | End: 2022-02-24 | Stop reason: HOSPADM

## 2022-02-22 RX ORDER — SODIUM CHLORIDE 0.9 % (FLUSH) 0.9 %
5 SYRINGE (ML) INJECTION
Status: DISCONTINUED | OUTPATIENT
Start: 2022-02-22 | End: 2022-02-24 | Stop reason: HOSPADM

## 2022-02-22 RX ORDER — AMLODIPINE BESYLATE 5 MG/1
5 TABLET ORAL EVERY MORNING
COMMUNITY

## 2022-02-22 RX ORDER — ONDANSETRON 2 MG/ML
4 INJECTION INTRAMUSCULAR; INTRAVENOUS EVERY 8 HOURS PRN
Status: DISCONTINUED | OUTPATIENT
Start: 2022-02-22 | End: 2022-02-24 | Stop reason: HOSPADM

## 2022-02-22 RX ORDER — AMLODIPINE BESYLATE 5 MG/1
5 TABLET ORAL DAILY
Status: DISCONTINUED | OUTPATIENT
Start: 2022-02-22 | End: 2022-02-24 | Stop reason: HOSPADM

## 2022-02-22 RX ORDER — FOLIC ACID 1 MG/1
1 TABLET ORAL DAILY
Status: DISCONTINUED | OUTPATIENT
Start: 2022-02-22 | End: 2022-02-24 | Stop reason: HOSPADM

## 2022-02-22 RX ORDER — CHOLECALCIFEROL (VITAMIN D3) 25 MCG
1000 TABLET ORAL DAILY
COMMUNITY

## 2022-02-22 RX ORDER — SULFAMETHOXAZOLE AND TRIMETHOPRIM 400; 80 MG/1; MG/1
1 TABLET ORAL 2 TIMES DAILY
Status: DISCONTINUED | OUTPATIENT
Start: 2022-02-22 | End: 2022-02-22

## 2022-02-22 RX ORDER — LORATADINE 10 MG/1
10 TABLET ORAL DAILY
COMMUNITY

## 2022-02-22 RX ORDER — SODIUM CHLORIDE, SODIUM LACTATE, POTASSIUM CHLORIDE, CALCIUM CHLORIDE 600; 310; 30; 20 MG/100ML; MG/100ML; MG/100ML; MG/100ML
INJECTION, SOLUTION INTRAVENOUS CONTINUOUS
Status: DISCONTINUED | OUTPATIENT
Start: 2022-02-22 | End: 2022-02-22

## 2022-02-22 RX ORDER — BENAZEPRIL HYDROCHLORIDE 40 MG/1
40 TABLET ORAL EVERY MORNING
COMMUNITY

## 2022-02-22 RX ADMIN — AMLODIPINE BESYLATE 5 MG: 5 TABLET ORAL at 08:02

## 2022-02-22 RX ADMIN — FOLIC ACID 1 MG: 1 TABLET ORAL at 08:02

## 2022-02-22 RX ADMIN — SULFAMETHOXAZOLE AND TRIMETHOPRIM 1 TABLET: 800; 160 TABLET ORAL at 08:02

## 2022-02-22 RX ADMIN — DIAZEPAM 5 MG: 5 TABLET ORAL at 03:02

## 2022-02-22 RX ADMIN — SODIUM CHLORIDE, SODIUM LACTATE, POTASSIUM CHLORIDE, AND CALCIUM CHLORIDE: .6; .31; .03; .02 INJECTION, SOLUTION INTRAVENOUS at 03:02

## 2022-02-22 RX ADMIN — DIAZEPAM 5 MG: 5 TABLET ORAL at 07:02

## 2022-02-22 RX ADMIN — Medication 1 TABLET: at 08:02

## 2022-02-22 RX ADMIN — ACETAMINOPHEN 1000 MG: 500 TABLET ORAL at 01:02

## 2022-02-22 RX ADMIN — SULFAMETHOXAZOLE AND TRIMETHOPRIM 1 TABLET: 800; 160 TABLET ORAL at 09:02

## 2022-02-22 RX ADMIN — LISINOPRIL 40 MG: 20 TABLET ORAL at 08:02

## 2022-02-22 RX ADMIN — Medication 100 MG: at 08:02

## 2022-02-22 NOTE — PT/OT/SLP EVAL
Occupational Therapy   Evaluation    Name: Juan Hatch  MRN: [id number]  Admitting Diagnosis:  Laceration of left middle finger without foreign body  Recent Surgery: * No surgery found *      Recommendations:     Discharge Recommendations: nursing facility, skilled  Discharge Equipment Recommendations:  bedside commode, bath bench  Barriers to discharge:  Decreased caregiver support    Assessment:     Juan Hatch is a 71 y.o. male with a medical diagnosis of Laceration of left middle finger without foreign body.  He presents with decreased independence with ADL's. Performance deficits affecting function: weakness, impaired endurance, impaired self care skills, impaired functional mobilty, impaired balance, decreased upper extremity function, decreased lower extremity function, decreased safety awareness, impaired coordination.      Rehab Prognosis: Good; patient would benefit from acute skilled OT services to address these deficits and reach maximum level of function.       Plan:     Patient to be seen 3 x/week to address the above listed problems via self-care/home management, therapeutic activities, therapeutic exercises  · Plan of Care Expires: 03/24/22  · Plan of Care Reviewed with: patient    Subjective   Pt reported that he will not have (A) at home.  Chief Complaint: tremors and anxiety  Patient/Family Comments/goals: none stated    Occupational Profile:  Living Environment: Pt resides in split house with room-mate in 2nd part of house.  Pt's house is 2 story with elevator access to the 2nd floor.  Pt has a bathtub on 1st floor & walk-in shower on 2nd floor. Pt reports independence with all ADL's & ambulation PTA.  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 Pt enjoys fishing, building projects, & reading.  Equipment Used at Home:  wheelchair, walker, rolling, hip kit  Assistance upon Discharge: none    Pain/Comfort:  · Pain Rating 1:  (did not rate)  · Location 1:  (left  hand)  · Pain Addressed 1: Distraction  · Pain Rating Post-Intervention 1:  (did not rate)    Patients cultural, spiritual, Church conflicts given the current situation: no    Objective:     Communicated with: RN prior to session.  Patient found supine with telemetry, pulse ox (continuous) (dressing to left hand) upon OT entry to room.    General Precautions: Standard, fall   Orthopedic Precautions:N/A   Braces: N/A    Occupational Performance:    Bed Mobility:    · Patient completed Supine to Sit with minimum assistance  · Patient completed Sit to Supine with minimum assistance    Functional Mobility/Transfers:  · Patient completed Sit <> Stand Transfer with CGA- Min (A) from EOB  with  no assistive device   · Functional Mobility: CGA-Min (A) with taking steps forward, backward, & to the left along EOB (pt noted to hold onto wall when able)    Activities of Daily Living:  · Upper Body Dressing: moderate assistance donning gown around back while seated EOB  · Lower Body Dressing: total assistance donning socks while seated EOB (pt reported that he uses a sock aid & reach for donning & doffing socks at home)    Cognitive/Visual Perceptual:  Cognitive/Psychosocial Skills:     -       Oriented to: Person, Place, Time and Situation   -       Follows Commands/attention:Follows multistep  commands  -       Safety awareness/insight to disability: intact     Physical Exam:  Sensation:    -       Intact  Dominant hand:    -       right  Upper Extremity Range of Motion:  WFL BUE except limitations due to dressing/splint & injury to left hand/finger  Upper Extremity Strength: WFL BUE (NT for left hand)   Strength: RUE WFL, NT LUE due to injury  Fine Motor Coordination: impaired LUE    AMPAC 6 Click ADL:  AMPAC Total Score: 13    Treatment & Education:  Provided education on safety & need for (A) with OOB activities.  Recommended use of bedside commode while in hospital for toileting needs with (A) from staff.  Provided  education regarding role of OT, POC, & discharge recommendations with pt verbalizing understanding.  Pt had no further questions & when asked whether there were any concerns pt reported none.  Education:    Patient left supine with all lines intact, call button in reach and RN notified    GOALS:   Multidisciplinary Problems     Occupational Therapy Goals        Problem: Occupational Therapy Goal    Goal Priority Disciplines Outcome Interventions   Occupational Therapy Goal     OT, PT/OT Ongoing, Progressing    Description: Goals to be met by: 3/12     Patient will increase functional independence with ADLs by performing:    UE Dressing with Supervision.  LE Dressing with Supervision and Assistive Devices as needed.  Grooming while standing with Supervision.  Toileting from bedside commode with Supervision for hygiene and clothing management.   Supine to sit with Modified [place].  Step transfer with Supervision                     History:     Past Medical History:   Diagnosis Date    Herniated lumbar intervertebral disc     Hypertension        No past surgical history on file.    Time Tracking:     OT Date of Treatment: 02/22/22  OT Start Time: 0914  OT Stop Time: 0932  OT Total Time (min): 18 min    Billable Minutes:Evaluation 10  Therapeutic Activity 8    2/22/2022

## 2022-02-22 NOTE — ED NOTES
Pt very unsteady on feet despite having this RN as well as using a walker to ambulate. Ambulated aprox. 2 steps before pt became unbalanced and almost fell

## 2022-02-22 NOTE — H&P
[facility] - Emergency Dept  [facility]  History & Physical    Patient Name: Juan Hatch  MRN: [id number]  Patient Class: OP- Observation  Admission Date: 2/21/2022  Attending Physician: KERRI Ortiz MD  Primary Care Provider: Selvin Mathews MD         Patient information was obtained from patient and ER records.     Subjective:     Principal Problem:Laceration of left middle finger without foreign body    Chief Complaint:   Chief Complaint   Patient presents with    Transfer     From [place] for hand laceration.         HPI: Mr. Hatch 71-year-old male with a history of hypertension and herniated disc who was transferred to [facility] emergency department from [facility] for a laceration to his left 3rd finger.  He noted he was walking and slipped hitting his left hand on a metal chair.  That occurred  between midnight and 2:00 a.m. on the morning of presentation.  He noted his last alcohol use had been around 8:00 p.m. the night prior.  He had no head trauma and no loss of consciousness with the fall.  He was taken to [facility] where he was found to have evidence of a distal laceration of the finger tip.  He was transferred here for Orthopedic Hand Surgery evaluation.  He underwent repair of the laceration.  Patient reported he received Tdap prior to transfer from [place].  He received antibiotics at the outside facility and Bactrim in the emergency department here.  Of note, he had some tremor and anxiety.  He reports that since around Valentine's he has been drinking 3-4 beers and 1 alcoholic drink per day.  He received a dose of Valium in the emergency department and noted that he felt better.  Hospital Medicine was asked to admit for further treatment.      Past Medical History:   Diagnosis Date    Herniated lumbar intervertebral disc     Hypertension        No past surgical history on file.    Review of patient's allergies indicates:  No Known Allergies    Current  Facility-Administered Medications on File Prior to Encounter   Medication    [COMPLETED] ceFAZolin (ANCEF) 1 gram in dextrose 5 % 50 mL IVPB (premix)    [COMPLETED] morphine injection 2 mg    [COMPLETED] neomycin-bacitracnZn-polymyxnB packet    [COMPLETED] ondansetron injection 4 mg    [DISCONTINUED] ceFAZolin injection 1 g     Current Outpatient Medications on File Prior to Encounter   Medication Sig    amLODIPine (NORVASC) 5 MG tablet Take 5 mg by mouth once daily.    benazepriL (LOTENSIN) 40 MG tablet Take 40 mg by mouth once daily.     Family History    None       Tobacco Use    Smoking status: Never Smoker    Smokeless tobacco: Never Used   Substance and Sexual Activity    Alcohol use: Yes    Drug use: Never    Sexual activity: Not Currently     Review of Systems   Constitutional:  Negative for chills, diaphoresis and fever.   HENT:  Negative for congestion and sore throat.    Eyes:  Negative for photophobia and visual disturbance.   Respiratory:  Negative for cough and shortness of breath.    Cardiovascular:  Negative for chest pain and palpitations.   Gastrointestinal:  Negative for abdominal pain, nausea and vomiting.   Genitourinary:  Negative for dysuria and hematuria.   Musculoskeletal:         Pain in his left hand.   Skin:  Negative for pallor and rash.        Dressed wound to his left hand   Neurological:  Negative for syncope, facial asymmetry, speech difficulty, weakness and headaches.   Psychiatric/Behavioral:  Negative for agitation and confusion.    Objective:     Vital Signs (Most Recent):  Temp: 98.5 °F (36.9 °C) (02/21/22 1656)  Pulse: 108 (02/21/22 2018)  Resp: 19 (02/21/22 1946)  BP: 124/76 (02/21/22 1946)  SpO2: 96 % (02/21/22 2018) Vital Signs (24h Range):  Temp:  [98.5 °F (36.9 °C)-98.9 °F (37.2 °C)] 98.5 °F (36.9 °C)  Pulse:  [] 108  Resp:  [16-27] 19  SpO2:  [95 %-100 %] 96 %  BP: (112-150)/(61-76) 124/76        There is no height or weight on file to calculate  BMI.    Physical Exam  Vitals reviewed.   Constitutional:       General: He is not in acute distress.  HENT:      Head: Normocephalic and atraumatic.      Mouth/Throat:      Mouth: Mucous membranes are dry.      Pharynx: Oropharynx is clear.   Eyes:      Extraocular Movements: Extraocular movements intact.      Conjunctiva/sclera: Conjunctivae normal.   Cardiovascular:      Rate and Rhythm: Normal rate and regular rhythm.   Pulmonary:      Effort: Pulmonary effort is normal. No respiratory distress.      Breath sounds: No wheezing.   Abdominal:      General: Abdomen is flat. Bowel sounds are normal.      Palpations: Abdomen is soft.      Tenderness: There is no abdominal tenderness.      Comments: Small, nontender umbilical hernia   Musculoskeletal:      Cervical back: Neck supple. No tenderness.      Comments: No lower extremity edema peripheral pulses palpable.  Left hand has a postprocedure dressing in place   Skin:     General: Skin is warm and dry.      Comments: Dressed wound to his left hand   Neurological:      General: No focal deficit present.      Mental Status: He is alert and oriented to person, place, and time.      Cranial Nerves: No cranial nerve deficit.      Comments: I did not assess  strength in left hand.  He is able to move both arms and both legs.           Significant Labs: All pertinent labs within the past 24 hours have been reviewed.  Recent Lab Results         02/21/22  1816   02/21/22  1444   02/21/22  1335   02/21/22  1329        Albumin     2.9         Alkaline Phosphatase     77         ALT     33         Anion Gap     14         Appearance, UA       Clear       AST     88         Bacteria, UA       Moderate       Baso #     0.07         Basophil %     0.9         Bilirubin (UA)       1+  Comment: Positive urine bilirubin is not confirmed. Correlate with   serum bilirubin and clinical presentation.         BILIRUBIN TOTAL     3.4  Comment: For infants and newborns, interpretation  of results should be based  on gestational age, weight and in agreement with clinical  observations.    Premature Infant recommended reference ranges:  Up to 24 hours.............<8.0 mg/dL  Up to 48 hours............<12.0 mg/dL  3-5 days..................<15.0 mg/dL  6-29 days.................<15.0 mg/dL           BUN     10         Calcium     8.4         Chloride     110         CO2     21         Color, UA       Aminah                    Creatinine     0.9         Differential Method     Automated         eGFR if      >60.0         eGFR if non      >60.0  Comment: Calculation used to obtain the estimated glomerular filtration  rate (eGFR) is the CKD-EPI equation.            Eos #     0.0         Eosinophil %     0.1         Glucose     126         Glucose, UA       Negative       Gran # (ANC)     6.8         Gran %     84.8         Hematocrit     36.2         Hemoglobin     12.4         Hyaline Casts, UA       2       Immature Grans (Abs)     0.02  Comment: Mild elevation in immature granulocytes is non specific and   can be seen in a variety of conditions including stress response,   acute inflammation, trauma and pregnancy. Correlation with other   laboratory and clinical findings is essential.           Immature Granulocytes     0.2         Ketones, UA       Negative       Leukocytes, UA       Negative       Lymph #     0.6         Lymph %     7.3         Magnesium     1.8         MCH     35.8         MCHC     34.3         MCV     105         Microscopic Comment       SEE COMMENT  Comment: Other formed elements not mentioned in the report are not   present in the microscopic examination.          Mono #     0.5         Mono %     6.7         MPV     10.7         NITRITE UA       Negative       nRBC     0         Occult Blood UA       1+       pH, UA       7.0       Platelets     59         Potassium     4.1         PROTEIN TOTAL     6.8         Protein, UA        1+  Comment: Recommend a 24 hour urine protein or a urine   protein/creatinine ratio if globulin induced proteinuria is  clinically suspected.         RBC     3.46         RBC, UA       1       RDW     13.2         SARS-CoV-2 RNA, Amplification, Qual   Negative  Comment: This test utilizes isothermal nucleic acid amplification   technology to detect the SARS-CoV-2 RdRp nucleic acid segment.   The analytical sensitivity (limit of detection) is 125 genome   equivalents/mL.     A POSITIVE result implies infection with the SARS-CoV-2 virus;  the patient is presumed to be contagious.    A NEGATIVE result means that SARS-CoV-2 nucleic acids are not  present above the limit of detection. A NEGATIVE result should be   treated as presumptive. It does not rule out the possibility of   COVID-19 and should not be the sole basis for treatment decisions.   If COVID-19 is strongly suspected based on clinical and exposure   history, re-testing using an alternate molecular assay should be   considered.       This test is only for use under the Food and Drug   Administration s Emergency Use Authorization (EUA).   Commercial kits are provided by [employer].   Performance characteristics of the EUA have been independently  verified by [facility] Department of  Pathology and Laboratory Medicine.   _________________________________________________________________  The ID NOW COVID-19 Letter of Authorization, along with the   authorized Fact Sheet for Healthcare Providers, the authorized Fact  Sheet for Patients, and authorized labeling are available on the FDA   website:  www.fda.gov/MedicalDevices/Safety/EmergencySituations/hnx524424.htm             Sodium     145         Specific [place], UA       1.015       Specimen UA       Urine, Clean Catch       Squam Epithel, UA       3       UROBILINOGEN UA       2.0-3.0       WBC, UA       1       WBC     8.04                 Significant Imaging: I have reviewed all  pertinent imaging results/findings within the past 24 hours.    CT head and C-spine had no acute abnormalities.    X-rays of his hips had no evidence of acute injury.    Chest x-ray had mild chronic interstitial changes.    X-ray of the left hand had soft tissue laceration/partial amputation of the distal 3rd digit with suspected small adjacent radiopaque foreign body a nondisplaced fracture involving the distal tuft of the 3rd digit    EKG was normal sinus rhythm and appeared to be a normal EKG.    Assessment/Plan:     * Laceration of left middle finger without foreign body  -appreciate Orthopedic Surgery input  -will place on Bactrim  -PT and OT are consulted to evaluate with his fall      Alcohol use  -Valium is ordered as needed for any withdrawal symptoms  -Thiamine, folic acid, multivitamin ordered  -perform CIWA scale and neurologic checks    HTN (hypertension)  -he is on benazepril and amlodipine at home.  Will continue antihypertensives        VTE Risk Mitigation (From admission, onward)         Ordered     Reason for No Pharmacological VTE Prophylaxis  Once        Question:  Reasons:  Answer:  Risk of Bleeding    02/22/22 0108     IP VTE HIGH RISK PATIENT  Once         02/22/22 0108     Place sequential compression device  Until discontinued         02/22/22 0108                   KERRI Ortiz MD  Department of Hospital Medicine   [facility] - Emergency Dept

## 2022-02-22 NOTE — HPI
Mr. Hatch is a 71 year old male with HTN who presents as a transfer to the [facility] ED for left hand laceration. Pt states he sustained a fall and tripped onto a chair and cut his hand open on a metal piece of the chair. He denies head trauma or loss of consciousness.  Denies any pulsatile bleeding or foreign bodies.  He is right hand dominant.  Denies any numbness or tendon injury.

## 2022-02-22 NOTE — ASSESSMENT & PLAN NOTE
-Valium is ordered as needed for any withdrawal symptoms  -Thiamine, folic acid, multivitamin ordered  -perform CIWA scale and neurologic checks

## 2022-02-22 NOTE — PT/OT/SLP EVAL
Physical Therapy Evaluation and Treatment     Patient Name:  Juan Hatch   MRN:  [id number]    Recommendations:     Discharge Recommendations:  nursing facility, skilled   Discharge Equipment Recommendations: bedside commode, bath bench   Barriers to discharge: Decreased caregiver support    Assessment:     Juan Hatch is a 71 y.o. male admitted with a medical diagnosis of Laceration of left middle finger without foreign body.  He presents with the following impairments/functional limitations:  weakness, impaired endurance, impaired self care skills, impaired functional mobilty, gait instability, impaired balance, decreased coordination. Pt evaluated after fall at home. Pt reports following fall, he was unable to get up for 6 hours. Pt demo's significant weakness, as well as tremulous activity throughout body, contributing to mobility deficits. Pt is able to stand and ambulate 3 steps with minimum assistance with use of a RW. At this time, pt presents below functional baseline, is not safe to return home 2/2 risk of falls. Upon discharge, pt would benefit from continued skilled therapy intervention at skilled nursing facility to progress toward more independent mobility. At this time, pt would continue to benefit from acute skilled therapy intervention to address deficits and progress toward prior level of function.       Rehab Prognosis: Good; patient would benefit from acute skilled PT services to address these deficits and reach maximum level of function.    Recent Surgery: * No surgery found *      Plan:     During this hospitalization, patient to be seen 4 x/week to address the identified rehab impairments via therapeutic activities, therapeutic exercises, gait training, neuromuscular re-education and progress toward the following goals:    · Plan of Care Expires:  03/22/22    Subjective     Chief Complaint: Pt reported feeling weak, worried about how he will take care of himself    Patient/Family Comments/goals: to get better and return home   Pain/Comfort:  · Pain Rating 1:  (pt reports pain in L hand at site of injury, did not quantify)  · Pain Addressed 1: Distraction, Pre-medicate for activity  · Pain Rating Post-Intervention 1:  (pt continues to report mild pain)    Patients cultural, spiritual, Restorationism conflicts given the current situation: no    Living Environment:  Pt lives with a friend (93 yo)  In a 2 STH with bed/bath on first floor, elevator to enter.   Prior to admission, patients level of function was independent with mobility and ADLs, occasionally uses RW when back pain flares.  Equipment used at home: wheelchair.  DME owned (not currently used): wheelchair.  Upon discharge, patient will have assistance from friend-however, unable to provide physical assistance.    Objective:     Communicated with RN prior to session.  Patient found HOB elevated with telemetry, pulse ox (continuous), peripheral IV  upon PT entry to room.    General Precautions: Standard, fall   Orthopedic Precautions:N/A   Braces: N/A  Respiratory Status: Room air    Exams:  · Cognitive Exam:  Patient is AAOx4, followed all commands, communicates clearly and fluently  · Gross Motor Coordination:  Impaired 2/2 tremulous activity   · RUE ROM: WFL  · RUE Strength: WFL  · LUE ROM: WFL  · LUE Strength: WFL  · RLE ROM: WFL  · RLE Strength: grossly 3-/5  · LLE ROM: WFL  · LLE Strength: grossly 3-/5    Functional Mobility:  · Bed Mobility:     · Supine to Sit: minimum assistance  · Sit to Supine: minimum assistance  · Transfers:     · Sit to Stand:2x from EOB with  minimum assistance with rolling walker, cuing provided for hand placement and anterior weight shift   Gait: Pt ambulated 3 lateral steps to the R along EOB with RW and minimum assistance. Pt demo'd small step size, decreased foot clearance, narrow PABLO, excessive sway, tremulous movement in body. Facilitation provided for lateral weight shift to promote  step initiation. Assistance provided to advance RW. Cuing provided for forward gaze and upright posture.     Therapeutic Activities and Exercises:   Pt educated on role of PT/POC. Pt verbalized understanding.   Pt encouraged to only perform OOB mobility with assistance from nursing/therapy. Pt agreeable.   PT discussed discharge options, provided education on importance of mobilization to achieve goals. Pt reports reluctance to eat 2/2 embarrassment related to inability to currently perform toileting independently. Pt also believed his IV was providing him with adequate nutrition. PT provided education on importance of nutrition for strength and recovery. Pt demo'd understanding.     AM-PAC 6 CLICK MOBILITY  Total Score:15     Patient left HOB elevated with all lines intact, call button in reach and RN notified.    GOALS:   Multidisciplinary Problems     Physical Therapy Goals        Problem: Physical Therapy Goal    Goal Priority Disciplines Outcome Goal Variances Interventions   Physical Therapy Goal     PT, PT/OT Ongoing, Progressing     Description: Goals to be met by: 3/8/2022     Patient will increase functional independence with mobility by performin. Supine to sit with Set-up [place]  2. Sit to stand transfer with Supervision  3. Bed to chair transfer with Supervision using Rolling Walker  4. Gait  x 100 feet with Supervision using Rolling Walker.   5. Stand for 5 minutes with Modified [place] using Rolling Walker while completing self care tasks                      History:     Past Medical History:   Diagnosis Date    Herniated lumbar intervertebral disc     Hypertension        No past surgical history on file.    Time Tracking:     PT Received On: 22  PT Start Time: 844     PT Stop Time: 911  PT Total Time (min): 27 min     Billable Minutes: Evaluation 10 mins  and Therapeutic Activity 17 mins       2022

## 2022-02-22 NOTE — ASSESSMENT & PLAN NOTE
-appreciate Orthopedic Surgery input  -will place on Bactrim  -PT and OT are consulted to evaluate with his fall

## 2022-02-22 NOTE — DISCHARGE INSTRUCTIONS
We have given you a prescription for 14 days of bactrim. Please take this medication twice daily. Orthopedics will follow-up with you in their clinic. Please ensure to keep your wound clean and dry.

## 2022-02-22 NOTE — PHARMACY MED REC
"Admission Medication History     The home medication history was taken by [facility].    You may go to "Admission" then "Reconcile Home Medications" tabs to review and/or act upon these items.      The home medication list has been updated by the Pharmacy department.    Please read ALL comments highlighted in yellow.    Please address this information as you see fit.     Feel free to contact us if you have any questions or require assistance.       Medications listed below were obtained from: Patient    Current Outpatient Medications on File Prior to Encounter   Medication Sig    amLODIPine (NORVASC) 5 MG tablet   Take 5 mg by mouth every morning.    aspirin (ECOTRIN) 81 MG EC tablet   Take 81 mg by mouth once daily.    benazepriL (LOTENSIN) 40 MG tablet   Take 40 mg by mouth every morning.    dext 70/polycarbophil/peg/NaCl (ARTIFICIAL TEAR SOLUTION OPHT)   Place 1 drop into both eyes 4 (four) times daily as needed (dry eye).    docusate sodium (STOOL SOFTENER ORAL)   Take 1 capsule by mouth daily as needed (constipation).    loratadine (CLARITIN) 10 mg tablet   Take 10 mg by mouth once daily.    multivit-min/FA/lycopen/lutein (MEN 50 PLUS MULTIVITAMIN ORAL)   Take 1 tablet by mouth once daily.    tetrahydrozoline HCl/zinc sulf (EYE DROPS ALLERGY RELIEF OPHT)   Place 1 drop into both eyes daily as needed (allergies).    vitamin B complex (VITAMINS B COMPLEX ORAL)   Take 1 tablet by mouth once daily.    vitamin D (VITAMIN D3) 1000 units Tab Take 1,000 Units by mouth once daily.       True Yip, [facility] [place]  EXT [id number]                    .          "

## 2022-02-22 NOTE — CONSULTS
[facility] - Emergency Dept  Orthopedics  Consult Note    Patient Name: Juan Hatch  MRN: [id number]  Admission Date: 2/21/2022  Hospital Length of Stay: 0 days  Attending Provider: Barbara Mena MD  Primary Care Provider: Selvin Mathews MD      Inpatient consult to Orthopedics  Consult performed by: Keo Bryant MD  Consult ordered by: La Lux MD        Subjective:     Principal Problem:<principal problem not specified>    Chief Complaint:   Chief Complaint   Patient presents with    Transfer     From [place] for hand laceration.         HPI: Mr. Hatch is a 71 year old male with HTN who presents as a transfer to the [facility] ED for left hand laceration. Pt states he sustained a fall and tripped onto a chair and cut his hand open on a metal piece of the chair. He denies head trauma or loss of consciousness.  Denies any pulsatile bleeding or foreign bodies.  He is right hand dominant.  Denies any numbness or tendon injury.         Past Medical History:   Diagnosis Date    Herniated lumbar intervertebral disc     Hypertension        No past surgical history on file.    Review of patient's allergies indicates:  No Known Allergies    Current Facility-Administered Medications   Medication    lactated ringers bolus 1,000 mL     Current Outpatient Medications   Medication Sig    sulfamethoxazole-trimethoprim 800-160mg (BACTRIM DS) 800-160 mg Tab Take 1 tablet by mouth 2 (two) times daily. for 14 days     Family History    None       Tobacco Use    Smoking status: Never Smoker    Smokeless tobacco: Never Used   Substance and Sexual Activity    Alcohol use: Yes    Drug use: Never    Sexual activity: Not Currently     ROS  Constitutional: negative for fevers  Eyes: negative visual changes  ENT: negative for hearing loss  Respiratory: negative for dyspnea  Cardiovascular: negative for chest pain  Gastrointestinal: negative for abdominal pain  Genitourinary: negative for dysuria  Neurological:  negative for headaches  Behavioral/Psych: negative for hallucinations  Endocrine: negative for temperature intolerance  Objective:     Vital Signs (Most Recent):  Temp: 98.5 °F (36.9 °C) (02/21/22 1656)  Pulse: 106 (02/21/22 1946)  Resp: 19 (02/21/22 1946)  BP: 124/76 (02/21/22 1946)  SpO2: 95 % (02/21/22 1946) Vital Signs (24h Range):  Temp:  [98.5 °F (36.9 °C)-98.9 °F (37.2 °C)] 98.5 °F (36.9 °C)  Pulse:  [] 106  Resp:  [16-27] 19  SpO2:  [95 %-100 %] 95 %  BP: (112-150)/(61-76) 124/76           There is no height or weight on file to calculate BMI.      Intake/Output Summary (Last 24 hours) at 2/21/2022 2311  Last data filed at 2/21/2022 1851  Gross per 24 hour   Intake 50 ml   Output --   Net 50 ml       Ortho/SPM Exam  Gen:  No acute distress, well-developed, well nourished  CV:  Peripherally well-perfused.   Respiratory:  Normal respiratory effort. No accessory muscle use.   Head/Neck:  Normocephalic.  Atraumatic.  Neuro: CN 2-12 grossly intact. No FND.  Abdomen: Soft, NTND    MSK:  LUE:  - Laceration to the volar oblique aspect of the third digit of the hand with loss of soft tissue coverage.  Visible volar aspect of P3 at the base of wound, without neurovascular structures visible.  Sensation intact to the radial and ulnar aspect of the long finger.  No pulsatile bleeding or foreign bodies.  No purulence.  Flexor and extensor tendon intact.  Brisk capillary refill throughout the digits  - No swelling  - No ecchymosis, erythema, or signs of cellulitis  - TTP to third digit of the hand  - AROM and PROM of the shoulder, elbow, wrist intact without pain, pain upon flexion and extension of the fingers, full ROM of all joints of the digits  - Axillary/AIN/PIN/Radial/Median/Ulnar Nerves assessed in isolation without deficit  - SILT throughout  - Compartments soft  - Radial artery palpated   - Capillary Refill <3s    RUE:  - Skin intact throughout, no open wounds  - No swelling  - No ecchymosis, erythema, or  signs of cellulitis  - NonTTP throughout  - AROM and PROM of the shoulder, elbow, wrist, and hand intact without pain  - Axillary/AIN/PIN/Radial/Median/Ulnar Nerves assessed in isolation without deficit  - SILT throughout  - Compartments soft  - Radial artery palpated       BLE:  - Skin intact throughout, no open wounds  - No swelling  - No ecchymosis, erythema, or signs of cellulitis  - NonTTP throughout  - AROM and PROM of the hip, knee, ankle, and foot intact without pain  - TA/EHL/Gastroc/FHL assessed in isolation without deficit  - SILT throughout  - Compartments soft  - DP and PT palpated  - Capillary Refill <3s  - Negative Log roll  - Negative [facility]        Significant Labs: CBC:   Recent Labs   Lab 02/21/22  1335   WBC 8.04   HGB 12.4*   HCT 36.2*   PLT 59*     CMP:   Recent Labs   Lab 02/21/22  1335      K 4.1      CO2 21*   *   BUN 10   CREATININE 0.9   CALCIUM 8.4*   PROT 6.8   ALBUMIN 2.9*   BILITOT 3.4*   ALKPHOS 77   AST 88*   ALT 33   ANIONGAP 14   EGFRNONAA >60.0     All pertinent labs within the past 24 hours have been reviewed.    Significant Imaging: I have reviewed all pertinent imaging results/findings.  X ray hand left: soft tissue loss at distal aspect of third digit with bone fragment                 Assessment/Plan:     Laceration of left middle finger without foreign body  Mr. Hatch is a 71 year old male with a past medical history of HTN who presents to the [facility] ED after a left third digit laceration with open toe fracture, NVI.  No evidence of arterial or neurologic injury.    - Ancef given at OSH, repeat ancef at [facility]   - Tdap updated at outside hospital  - Wound irrigated, explored, and sutured at bedside with good approximation  - No evidence of tendon injury on exam  - No acute surgical intervention indicated  - Bactrim x 14 days upon discharge  - Follow up in orthopedic hand clinic in one week, pt will be contacted with appointment details    Procedure Note:   Left long finger laceration repair  Patient was explained risks, benefits, and alternatives to treatment and verbalized consent to proceed. Patient and location was confirmed.  10 cc of 1% lidocaine was injected for a digital block after local cleaning with alcohol swabs. Distal extremity was cleansed with betadine and draped with blue towels. Nail bed was removed from nail bed using hemostat.  Wound was irrigated with 2 L of normal saline and Betadine.  See objective for examination.  A combination of 4-0 nylon simple interrupted sutures were used to approximate the volar aspect of laceration with good soft tissue coverage of exposed bone.  Due to proximity of nail bed from the volar aspect the LF fingernail was removed to further examine the nail bed in this was without disruption.  The eponychial fold was stented open with Xeroform.  Wound was dressed with soft dressing of xeroform, 4x4, cast padding, and finger splint, ACE bandage. No complications were noted. The finger demonstrated capillary refill at the conclusion of the procedure.            Keo Bryant MD  Orthopedics  [facility] - Emergency Dept

## 2022-02-22 NOTE — SUBJECTIVE & OBJECTIVE
Past Medical History:   Diagnosis Date    Herniated lumbar intervertebral disc     Hypertension        No past surgical history on file.    Review of patient's allergies indicates:  No Known Allergies    Current Facility-Administered Medications   Medication    lactated ringers bolus 1,000 mL     Current Outpatient Medications   Medication Sig    sulfamethoxazole-trimethoprim 800-160mg (BACTRIM DS) 800-160 mg Tab Take 1 tablet by mouth 2 (two) times daily. for 14 days     Family History    None       Tobacco Use    Smoking status: Never Smoker    Smokeless tobacco: Never Used   Substance and Sexual Activity    Alcohol use: Yes    Drug use: Never    Sexual activity: Not Currently     ROS  Constitutional: negative for fevers  Eyes: negative visual changes  ENT: negative for hearing loss  Respiratory: negative for dyspnea  Cardiovascular: negative for chest pain  Gastrointestinal: negative for abdominal pain  Genitourinary: negative for dysuria  Neurological: negative for headaches  Behavioral/Psych: negative for hallucinations  Endocrine: negative for temperature intolerance  Objective:     Vital Signs (Most Recent):  Temp: 98.5 °F (36.9 °C) (02/21/22 1656)  Pulse: 106 (02/21/22 1946)  Resp: 19 (02/21/22 1946)  BP: 124/76 (02/21/22 1946)  SpO2: 95 % (02/21/22 1946) Vital Signs (24h Range):  Temp:  [98.5 °F (36.9 °C)-98.9 °F (37.2 °C)] 98.5 °F (36.9 °C)  Pulse:  [] 106  Resp:  [16-27] 19  SpO2:  [95 %-100 %] 95 %  BP: (112-150)/(61-76) 124/76           There is no height or weight on file to calculate BMI.      Intake/Output Summary (Last 24 hours) at 2/21/2022 2311  Last data filed at 2/21/2022 1851  Gross per 24 hour   Intake 50 ml   Output --   Net 50 ml       Ortho/SPM Exam  Gen:  No acute distress, well-developed, well nourished  CV:  Peripherally well-perfused.   Respiratory:  Normal respiratory effort. No accessory muscle use.   Head/Neck:  Normocephalic.  Atraumatic.  Neuro: CN 2-12 grossly intact. No  FND.  Abdomen: Soft, NTND    MSK:  LUE:  - Laceration to the volar oblique aspect of the third digit of the hand with loss of soft tissue coverage.  Visible volar aspect of P3 at the base of wound, without neurovascular structures visible.  Sensation intact to the radial and ulnar aspect of the long finger.  No pulsatile bleeding or foreign bodies.  No purulence.  Flexor and extensor tendon intact.  Brisk capillary refill throughout the digits  - No swelling  - No ecchymosis, erythema, or signs of cellulitis  - TTP to third digit of the hand  - AROM and PROM of the shoulder, elbow, wrist intact without pain, pain upon flexion and extension of the fingers, full ROM of all joints of the digits  - Axillary/AIN/PIN/Radial/Median/Ulnar Nerves assessed in isolation without deficit  - SILT throughout  - Compartments soft  - Radial artery palpated   - Capillary Refill <3s    RUE:  - Skin intact throughout, no open wounds  - No swelling  - No ecchymosis, erythema, or signs of cellulitis  - NonTTP throughout  - AROM and PROM of the shoulder, elbow, wrist, and hand intact without pain  - Axillary/AIN/PIN/Radial/Median/Ulnar Nerves assessed in isolation without deficit  - SILT throughout  - Compartments soft  - Radial artery palpated       BLE:  - Skin intact throughout, no open wounds  - No swelling  - No ecchymosis, erythema, or signs of cellulitis  - NonTTP throughout  - AROM and PROM of the hip, knee, ankle, and foot intact without pain  - TA/EHL/Gastroc/FHL assessed in isolation without deficit  - SILT throughout  - Compartments soft  - DP and PT palpated  - Capillary Refill <3s  - Negative Log roll  - Negative [facility]        Significant Labs: CBC:   Recent Labs   Lab 02/21/22  1335   WBC 8.04   HGB 12.4*   HCT 36.2*   PLT 59*     CMP:   Recent Labs   Lab 02/21/22  1335      K 4.1      CO2 21*   *   BUN 10   CREATININE 0.9   CALCIUM 8.4*   PROT 6.8   ALBUMIN 2.9*   BILITOT 3.4*   ALKPHOS 77   AST 88*    ALT 33   ANIONGAP 14   EGFRNONAA >60.0     All pertinent labs within the past 24 hours have been reviewed.    Significant Imaging: I have reviewed all pertinent imaging results/findings.  X ray hand left: soft tissue loss at distal aspect of third digit with bone fragment

## 2022-02-22 NOTE — HPI
Mr. Hatch 71-year-old male with a history of hypertension and herniated disc who was transferred to [facility] emergency department from [facility] for a laceration to his left 3rd finger.  He noted he was walking and slipped hitting his left hand on a metal chair.  That occurred  between midnight and 2:00 a.m. on the morning of presentation.  He noted his last alcohol use had been around 8:00 p.m. the night prior.  He had no head trauma and no loss of consciousness with the fall.  He was taken to [facility] where he was found to have evidence of a distal laceration of the finger tip.  He was transferred here for Orthopedic Hand Surgery evaluation.  He underwent repair of the laceration.  Patient reported he received Tdap prior to transfer from [place].  He received antibiotics at the outside facility and Bactrim in the emergency department here.  Of note, he had some tremor and anxiety.  He reports that since around Valentine's he has been drinking 3-4 beers and 1 alcoholic drink per day.  He received a dose of Valium in the emergency department and noted that he felt better.  Hospital Medicine was asked to admit for further treatment.

## 2022-02-22 NOTE — SUBJECTIVE & OBJECTIVE
Past Medical History:   Diagnosis Date    Herniated lumbar intervertebral disc     Hypertension        No past surgical history on file.    Review of patient's allergies indicates:  No Known Allergies    Current Facility-Administered Medications on File Prior to Encounter   Medication    [COMPLETED] ceFAZolin (ANCEF) 1 gram in dextrose 5 % 50 mL IVPB (premix)    [COMPLETED] morphine injection 2 mg    [COMPLETED] neomycin-bacitracnZn-polymyxnB packet    [COMPLETED] ondansetron injection 4 mg    [DISCONTINUED] ceFAZolin injection 1 g     Current Outpatient Medications on File Prior to Encounter   Medication Sig    amLODIPine (NORVASC) 5 MG tablet Take 5 mg by mouth once daily.    benazepriL (LOTENSIN) 40 MG tablet Take 40 mg by mouth once daily.     Family History    None       Tobacco Use    Smoking status: Never Smoker    Smokeless tobacco: Never Used   Substance and Sexual Activity    Alcohol use: Yes    Drug use: Never    Sexual activity: Not Currently     Review of Systems   Constitutional:  Negative for chills, diaphoresis and fever.   HENT:  Negative for congestion and sore throat.    Eyes:  Negative for photophobia and visual disturbance.   Respiratory:  Negative for cough and shortness of breath.    Cardiovascular:  Negative for chest pain and palpitations.   Gastrointestinal:  Negative for abdominal pain, nausea and vomiting.   Genitourinary:  Negative for dysuria and hematuria.   Musculoskeletal:         Pain in his left hand.   Skin:  Negative for pallor and rash.        Dressed wound to his left hand   Neurological:  Negative for syncope, facial asymmetry, speech difficulty, weakness and headaches.   Psychiatric/Behavioral:  Negative for agitation and confusion.    Objective:     Vital Signs (Most Recent):  Temp: 98.5 °F (36.9 °C) (02/21/22 1656)  Pulse: 108 (02/21/22 2018)  Resp: 19 (02/21/22 1946)  BP: 124/76 (02/21/22 1946)  SpO2: 96 % (02/21/22 2018) Vital Signs (24h Range):  Temp:  [98.5 °F (36.9  °C)-98.9 °F (37.2 °C)] 98.5 °F (36.9 °C)  Pulse:  [] 108  Resp:  [16-27] 19  SpO2:  [95 %-100 %] 96 %  BP: (112-150)/(61-76) 124/76        There is no height or weight on file to calculate BMI.    Physical Exam  Vitals reviewed.   Constitutional:       General: He is not in acute distress.  HENT:      Head: Normocephalic and atraumatic.      Mouth/Throat:      Mouth: Mucous membranes are dry.      Pharynx: Oropharynx is clear.   Eyes:      Extraocular Movements: Extraocular movements intact.      Conjunctiva/sclera: Conjunctivae normal.   Cardiovascular:      Rate and Rhythm: Normal rate and regular rhythm.   Pulmonary:      Effort: Pulmonary effort is normal. No respiratory distress.      Breath sounds: No wheezing.   Abdominal:      General: Abdomen is flat. Bowel sounds are normal.      Palpations: Abdomen is soft.      Tenderness: There is no abdominal tenderness.      Comments: Small, nontender umbilical hernia   Musculoskeletal:      Cervical back: Neck supple. No tenderness.      Comments: No lower extremity edema peripheral pulses palpable.  Left hand has a postprocedure dressing in place   Skin:     General: Skin is warm and dry.      Comments: Dressed wound to his left hand   Neurological:      General: No focal deficit present.      Mental Status: He is alert and oriented to person, place, and time.      Cranial Nerves: No cranial nerve deficit.      Comments: I did not assess  strength in left hand.  He is able to move both arms and both legs.           Significant Labs: All pertinent labs within the past 24 hours have been reviewed.  Recent Lab Results         02/21/22  1816   02/21/22  1444   02/21/22  1335   02/21/22  1329        Albumin     2.9         Alkaline Phosphatase     77         ALT     33         Anion Gap     14         Appearance, UA       Clear       AST     88         Bacteria, UA       Moderate       Baso #     0.07         Basophil %     0.9         Bilirubin (UA)        1+  Comment: Positive urine bilirubin is not confirmed. Correlate with   serum bilirubin and clinical presentation.         BILIRUBIN TOTAL     3.4  Comment: For infants and newborns, interpretation of results should be based  on gestational age, weight and in agreement with clinical  observations.    Premature Infant recommended reference ranges:  Up to 24 hours.............<8.0 mg/dL  Up to 48 hours............<12.0 mg/dL  3-5 days..................<15.0 mg/dL  6-29 days.................<15.0 mg/dL           BUN     10         Calcium     8.4         Chloride     110         CO2     21         Color, UA       [place]min[place]                    Creatinine     0.9         Differential Method     Automated         eGFR if      >60.0         eGFR if non      >60.0  Comment: Calculation used to obtain the estimated glomerular filtration  rate (eGFR) is the CKD-EPI equation.            Eos #     0.0         Eosinophil %     0.1         Glucose     126         Glucose, UA       Negative       Gran # (ANC)     6.8         Gran %     84.8         Hematocrit     36.2         Hemoglobin     12.4         Hyaline Casts, UA       2       Immature Grans (Abs)     0.02  Comment: Mild elevation in immature granulocytes is non specific and   can be seen in a variety of conditions including stress response,   acute inflammation, trauma and pregnancy. Correlation with other   laboratory and clinical findings is essential.           Immature Granulocytes     0.2         Ketones, UA       Negative       Leukocytes, UA       Negative       Lymph #     0.6         Lymph %     7.3         Magnesium     1.8         MCH     35.8         MCHC     34.3         MCV     105         Microscopic Comment       SEE COMMENT  Comment: Other formed elements not mentioned in the report are not   present in the microscopic examination.          Mono #     0.5         Mono %     6.7         MPV     10.7         NITRITE UA        Negative       nRBC     0         Occult Blood UA       1+       pH, UA       7.0       Platelets     59         Potassium     4.1         PROTEIN TOTAL     6.8         Protein, UA       1+  Comment: Recommend a 24 hour urine protein or a urine   protein/creatinine ratio if globulin induced proteinuria is  clinically suspected.         RBC     3.46         RBC, UA       1       RDW     13.2         SARS-CoV-2 RNA, Amplification, Qual   Negative  Comment: This test utilizes isothermal nucleic acid amplification   technology to detect the SARS-CoV-2 RdRp nucleic acid segment.   The analytical sensitivity (limit of detection) is 125 genome   equivalents/mL.     A POSITIVE result implies infection with the SARS-CoV-2 virus;  the patient is presumed to be contagious.    A NEGATIVE result means that SARS-CoV-2 nucleic acids are not  present above the limit of detection. A NEGATIVE result should be   treated as presumptive. It does not rule out the possibility of   COVID-19 and should not be the sole basis for treatment decisions.   If COVID-19 is strongly suspected based on clinical and exposure   history, re-testing using an alternate molecular assay should be   considered.       This test is only for use under the Food and Drug   Administration s Emergency Use Authorization (EUA).   Commercial kits are provided by [employer].   Performance characteristics of the EUA have been independently  verified by [facility] Department of  Pathology and Laboratory Medicine.   _________________________________________________________________  The ID NOW COVID-19 Letter of Authorization, along with the   authorized Fact Sheet for Healthcare Providers, the authorized Fact  Sheet for Patients, and authorized labeling are available on the FDA   website:  www.fda.gov/MedicalDevices/Safety/EmergencySituations/icx381774.htm             Sodium     145         Specific [place], UA       1.015       Specimen UA        Urine, Clean Catch       Squam Epithel, UA       3       UROBILINOGEN UA       2.0-3.0       WBC, UA       1       WBC     8.04                 Significant Imaging: I have reviewed all pertinent imaging results/findings within the past 24 hours.    CT head and C-spine had no acute abnormalities.    X-rays of his hips had no evidence of acute injury.    Chest x-ray had mild chronic interstitial changes.    X-ray of the left hand had soft tissue laceration/partial amputation of the distal 3rd digit with suspected small adjacent radiopaque foreign body a nondisplaced fracture involving the distal tuft of the 3rd digit    EKG was normal sinus rhythm and appeared to be a normal EKG.

## 2022-02-22 NOTE — PROCEDURES
Juan Hatch is a 71 y.o. male patient.    Temp: 98.5 °F (36.9 °C) (02/21/22 1656)  Pulse: 108 (02/21/22 2018)  Resp: 19 (02/21/22 1946)  BP: 124/76 (02/21/22 1946)  SpO2: 96 % (02/21/22 2018)       Orthopedic Injury Treatment    Date/Time: 2/21/2022 11:34 PM  Performed by: Keo Bryant MD  Authorized by: Tank Hernandez MD     Location procedure was performed:  [facility] EMERGENCY DEPARTMENT  Assisting Provider:  Mekhi Dyson MD  Pre-operative diagnosis:  Left distal third digit laceration  Post-operative diagnosis:  Left distal third digit laceration  Consent Done?:  Yes  Universal Protocol:     Verbal consent obtained?: Yes      Risks and benefits: Risks, benefits and alternatives were discussed      Consent given by:  Patient    Patient states understanding of procedure being performed: Yes      Procedure consent matches procedure scheduled: Yes      Required items: Required blood products, implants, devices and special equipment avialable      Patient identity confirmed:  Verbally with patient  Injury:     Injury location:  Hand    Location details:  Left hand    Injury type:  Soft tissue      Pre-procedure assessment:     Neurovascular status: Neurovascularly intact      Distal perfusion: normal      Neurological function: normal      Range of motion: normal      Local anesthesia used?: Yes      Anesthesia:  Digital block    Local anesthetic:  Lidocaine 1% without epinephrine    Anesthetic total (ml):  10    Patient sedated?: No        Procedure details:     Description of findings:  Soft tissue disruption without evidence of exposed tenon or bone, small injury to nailbed  Selections made in this section will also lock the Injury type section above.:     Technical Procedures Used:  Digital block, irrigation with 2L normal saline and betadine, repair of soft tissue defect with nylon sutures, removal of nail bed, tenting of nail bed with xerofrom    Complications: No      Estimated blood  loss (mL):  20    Specimens: No      Implants: No    Post-procedure assessment:     Neurovascular status: Neurovascularly intact      Distal perfusion: normal      Neurological function: normal      Range of motion: normal      Patient tolerance:  Patient tolerated the procedure well with no immediate complications        2/21/2022

## 2022-02-22 NOTE — PLAN OF CARE
Problem: Occupational Therapy Goal  Goal: Occupational Therapy Goal  Description: Goals to be met by: 3/12     Patient will increase functional independence with ADLs by performing:    UE Dressing with Supervision.  LE Dressing with Supervision and Assistive Devices as needed.  Grooming while standing with Supervision.  Toileting from bedside commode with Supervision for hygiene and clothing management.   Supine to sit with Modified [place].  Step transfer with Supervision    Outcome: Ongoing, Progressing     OT eval completed.

## 2022-02-22 NOTE — PLAN OF CARE
Problem: Physical Therapy Goal  Goal: Physical Therapy Goal  Description: Goals to be met by: 3/8/2022     Patient will increase functional independence with mobility by performin. Supine to sit with Set-up [place]  2. Sit to stand transfer with Supervision  3. Bed to chair transfer with Supervision using Rolling Walker  4. Gait  x 100 feet with Supervision using Rolling Walker.   5. Stand for 5 minutes with Modified [place] using Rolling Walker while completing self care tasks     Outcome: Ongoing, Progressing     Pt evaluated and appropriate goals established. Pt presents below reported functional baseline. Upon discharge, pt would benefit from continued skilled therapy intervention at skilled nursing facility to progress toward more independent mobility. At this time, pt would continue to benefit from acute skilled therapy intervention to address deficits and progress toward prior level of function.

## 2022-02-22 NOTE — ASSESSMENT & PLAN NOTE
Mr. Hatch is a 71 year old male with a past medical history of HTN who presents to the [facility] [place] after a left third digit laceration with open toe fracture, NVI.  No evidence of arterial or neurologic injury.    - Ancef given at OSH, repeat ancef at [facility]   - Tdap updated at outside hospital  - Wound irrigated, explored, and sutured at bedside with good approximation  - No evidence of tendon injury on exam  - No acute surgical intervention indicated  - Bactrim x 14 days upon discharge  - Follow up in orthopedic hand clinic in one week, pt will be contacted with appointment details    Procedure Note:  Left long finger laceration repair  Patient was explained risks, benefits, and alternatives to treatment and verbalized consent to proceed. Patient and location was confirmed.  10 cc of 1% lidocaine was injected for a digital block after local cleaning with alcohol swabs. Distal extremity was cleansed with betadine and draped with blue towels. Nail bed was removed from nail bed using hemostat.  Wound was irrigated with 2 L of normal saline and Betadine.  See objective for examination.  A combination of 4-0 nylon simple interrupted sutures were used to approximate the volar aspect of laceration with good soft tissue coverage of exposed bone.  Due to proximity of nail bed from the volar aspect the LF fingernail was removed to further examine the nail bed in this was without disruption.  The eponychial fold was stented open with Xeroform.  Wound was dressed with soft dressing of xeroform, 4x4, cast padding, and finger splint, ACE bandage. No complications were noted.

## 2022-02-23 PROBLEM — D53.9 MACROCYTIC ANEMIA: Status: ACTIVE | Noted: 2022-02-23

## 2022-02-23 PROBLEM — D69.6 THROMBOCYTOPENIA: Status: ACTIVE | Noted: 2022-02-23

## 2022-02-23 PROBLEM — R74.01 ELEVATED TRANSAMINASE LEVEL: Status: ACTIVE | Noted: 2022-02-23

## 2022-02-23 LAB
ALBUMIN SERPL BCP-MCNC: 2.7 G/DL (ref 3.5–5.2)
ALP SERPL-CCNC: 78 U/L (ref 55–135)
ALT SERPL W/O P-5'-P-CCNC: 25 U/L (ref 10–44)
ANION GAP SERPL CALC-SCNC: 9 MMOL/L (ref 8–16)
AST SERPL-CCNC: 72 U/L (ref 10–40)
BASOPHILS # BLD AUTO: 0.08 K/UL (ref 0–0.2)
BASOPHILS NFR BLD: 1.4 % (ref 0–1.9)
BILIRUB SERPL-MCNC: 3.5 MG/DL (ref 0.1–1)
BUN SERPL-MCNC: 12 MG/DL (ref 8–23)
CALCIUM SERPL-MCNC: 8.4 MG/DL (ref 8.7–10.5)
CHLORIDE SERPL-SCNC: 107 MMOL/L (ref 95–110)
CO2 SERPL-SCNC: 25 MMOL/L (ref 23–29)
CREAT SERPL-MCNC: 0.8 MG/DL (ref 0.5–1.4)
DIFFERENTIAL METHOD: ABNORMAL
EOSINOPHIL # BLD AUTO: 0.1 K/UL (ref 0–0.5)
EOSINOPHIL NFR BLD: 2.3 % (ref 0–8)
ERYTHROCYTE [DISTWIDTH] IN BLOOD BY AUTOMATED COUNT: 13.2 % (ref 11.5–14.5)
EST. GFR  (AFRICAN AMERICAN): >60 ML/MIN/1.73 M^2
EST. GFR  (NON AFRICAN AMERICAN): >60 ML/MIN/1.73 M^2
FOLATE SERPL-MCNC: 24.1 NG/ML (ref 4–24)
GLUCOSE SERPL-MCNC: 88 MG/DL (ref 70–110)
HCT VFR BLD AUTO: 30.8 % (ref 40–54)
HGB BLD-MCNC: 10.3 G/DL (ref 14–18)
IMM GRANULOCYTES # BLD AUTO: 0.03 K/UL (ref 0–0.04)
IMM GRANULOCYTES NFR BLD AUTO: 0.5 % (ref 0–0.5)
INR PPP: 1.5 (ref 0.8–1.2)
LYMPHOCYTES # BLD AUTO: 1.3 K/UL (ref 1–4.8)
LYMPHOCYTES NFR BLD: 22.9 % (ref 18–48)
MCH RBC QN AUTO: 36 PG (ref 27–31)
MCHC RBC AUTO-ENTMCNC: 33.4 G/DL (ref 32–36)
MCV RBC AUTO: 108 FL (ref 82–98)
MONOCYTES # BLD AUTO: 0.6 K/UL (ref 0.3–1)
MONOCYTES NFR BLD: 10.6 % (ref 4–15)
NEUTROPHILS # BLD AUTO: 3.4 K/UL (ref 1.8–7.7)
NEUTROPHILS NFR BLD: 62.3 % (ref 38–73)
NRBC BLD-RTO: 0 /100 WBC
PLATELET # BLD AUTO: 47 K/UL (ref 150–450)
PMV BLD AUTO: 11.3 FL (ref 9.2–12.9)
POTASSIUM SERPL-SCNC: 3.7 MMOL/L (ref 3.5–5.1)
PROT SERPL-MCNC: 6.2 G/DL (ref 6–8.4)
PROTHROMBIN TIME: 14.9 SEC (ref 9–12.5)
RBC # BLD AUTO: 2.86 M/UL (ref 4.6–6.2)
SODIUM SERPL-SCNC: 141 MMOL/L (ref 136–145)
VIT B12 SERPL-MCNC: 1536 PG/ML (ref 210–950)
WBC # BLD AUTO: 5.54 K/UL (ref 3.9–12.7)

## 2022-02-23 PROCEDURE — 82607 VITAMIN B-12: CPT | Performed by: STUDENT IN AN ORGANIZED HEALTH CARE EDUCATION/TRAINING PROGRAM

## 2022-02-23 PROCEDURE — 99224 PR SUBSEQUENT OBSERVATION CARE,LEVEL I: CPT | Mod: ,,, | Performed by: STUDENT IN AN ORGANIZED HEALTH CARE EDUCATION/TRAINING PROGRAM

## 2022-02-23 PROCEDURE — 25000003 PHARM REV CODE 250: Performed by: INTERNAL MEDICINE

## 2022-02-23 PROCEDURE — 85025 COMPLETE CBC W/AUTO DIFF WBC: CPT | Performed by: STUDENT IN AN ORGANIZED HEALTH CARE EDUCATION/TRAINING PROGRAM

## 2022-02-23 PROCEDURE — 30200315 PPD INTRADERMAL TEST REV CODE 302: Performed by: STUDENT IN AN ORGANIZED HEALTH CARE EDUCATION/TRAINING PROGRAM

## 2022-02-23 PROCEDURE — G0378 HOSPITAL OBSERVATION PER HR: HCPCS

## 2022-02-23 PROCEDURE — 25000003 PHARM REV CODE 250: Performed by: STUDENT IN AN ORGANIZED HEALTH CARE EDUCATION/TRAINING PROGRAM

## 2022-02-23 PROCEDURE — 86580 TB INTRADERMAL TEST: CPT | Performed by: STUDENT IN AN ORGANIZED HEALTH CARE EDUCATION/TRAINING PROGRAM

## 2022-02-23 PROCEDURE — 80053 COMPREHEN METABOLIC PANEL: CPT | Performed by: STUDENT IN AN ORGANIZED HEALTH CARE EDUCATION/TRAINING PROGRAM

## 2022-02-23 PROCEDURE — 85610 PROTHROMBIN TIME: CPT | Performed by: STUDENT IN AN ORGANIZED HEALTH CARE EDUCATION/TRAINING PROGRAM

## 2022-02-23 PROCEDURE — 36415 COLL VENOUS BLD VENIPUNCTURE: CPT | Performed by: STUDENT IN AN ORGANIZED HEALTH CARE EDUCATION/TRAINING PROGRAM

## 2022-02-23 PROCEDURE — 99224 PR SUBSEQUENT OBSERVATION CARE,LEVEL I: ICD-10-PCS | Mod: ,,, | Performed by: STUDENT IN AN ORGANIZED HEALTH CARE EDUCATION/TRAINING PROGRAM

## 2022-02-23 PROCEDURE — 82746 ASSAY OF FOLIC ACID SERUM: CPT | Performed by: STUDENT IN AN ORGANIZED HEALTH CARE EDUCATION/TRAINING PROGRAM

## 2022-02-23 RX ORDER — DIAZEPAM 5 MG/1
10 TABLET ORAL EVERY 8 HOURS PRN
Status: DISCONTINUED | OUTPATIENT
Start: 2022-02-23 | End: 2022-02-23

## 2022-02-23 RX ORDER — DIAZEPAM 5 MG/1
10 TABLET ORAL EVERY 8 HOURS
Status: DISCONTINUED | OUTPATIENT
Start: 2022-02-23 | End: 2022-02-23

## 2022-02-23 RX ADMIN — TUBERCULIN PURIFIED PROTEIN DERIVATIVE 5 UNITS: 5 INJECTION, SOLUTION INTRADERMAL at 02:02

## 2022-02-23 RX ADMIN — LISINOPRIL 40 MG: 20 TABLET ORAL at 08:02

## 2022-02-23 RX ADMIN — DIAZEPAM 10 MG: 5 TABLET ORAL at 12:02

## 2022-02-23 RX ADMIN — SULFAMETHOXAZOLE AND TRIMETHOPRIM 1 TABLET: 800; 160 TABLET ORAL at 09:02

## 2022-02-23 RX ADMIN — SULFAMETHOXAZOLE AND TRIMETHOPRIM 1 TABLET: 800; 160 TABLET ORAL at 08:02

## 2022-02-23 RX ADMIN — Medication 100 MG: at 08:02

## 2022-02-23 RX ADMIN — AMLODIPINE BESYLATE 5 MG: 5 TABLET ORAL at 08:02

## 2022-02-23 RX ADMIN — Medication 1 TABLET: at 09:02

## 2022-02-23 RX ADMIN — FOLIC ACID 1 MG: 1 TABLET ORAL at 08:02

## 2022-02-23 NOTE — HOSPITAL COURSE
Patient has very mild withdrawal on exam ED so placed in observation. Tremors improved rapidly.  Patient discharged to SNF near his home in [place] for debility.  Initially, followup will need to be virtually until discharged from SNF.

## 2022-02-23 NOTE — PROGRESS NOTES
[facility] Medicine  Progress Note    Patient Name: Juan Hatch  MRN: [id number]  Patient Class: OP- Observation   Admission Date: 2/21/2022  Length of Stay: 0 days  Attending Physician: Cheryl Martin MD  Primary Care Provider: Selvin Mathews MD        Subjective:     Principal Problem:Laceration of left middle finger without foreign body        HPI:  Mr. Hatch 71-year-old male with a history of hypertension and herniated disc who was transferred to [facility] emergency department from [facility] for a laceration to his left 3rd finger.  He noted he was walking and slipped hitting his left hand on a metal chair.  That occurred  between midnight and 2:00 a.m. on the morning of presentation.  He noted his last alcohol use had been around 8:00 p.m. the night prior.  He had no head trauma and no loss of consciousness with the fall.  He was taken to [facility] where he was found to have evidence of a distal laceration of the finger tip.  He was transferred here for Orthopedic Hand Surgery evaluation.  He underwent repair of the laceration.  Patient reported he received Tdap prior to transfer from [place].  He received antibiotics at the outside facility and Bactrim in the emergency department here.  Of note, he had some tremor and anxiety.  He reports that since around Valentine's he has been drinking 3-4 beers and 1 alcoholic drink per day.  He received a dose of Valium in the emergency department and noted that he felt better.  Hospital Medicine was asked to admit for further treatment.      Overview/Hospital Course:  Patient has very mild withdrawal on exam. He is currently pending placement.       Interval History: No acute events overnight. Patient this AM states that he is in good spirits. He feels less tremulous than when he first came in. He is excited to learn that he doesn't have visible scarring of his liver. He states that he plans on quitting etoh altogether after  he leaves a SNF.    Review of Systems   Constitutional:  Negative for chills and fever.   HENT:  Negative for congestion and sore throat.    Eyes:  Negative for photophobia and visual disturbance.   Respiratory:  Negative for cough and shortness of breath.    Cardiovascular:  Negative for chest pain and palpitations.   Gastrointestinal:  Negative for abdominal pain, blood in stool, constipation, diarrhea, nausea and vomiting.   Endocrine: Negative for cold intolerance and heat intolerance.   Genitourinary:  Negative for dysuria and hematuria.   Musculoskeletal:  Positive for arthralgias (L hand pain). Negative for myalgias.   Skin:  Negative for rash and wound.   Allergic/Immunologic: Negative for environmental allergies and food allergies.   Neurological:  Negative for seizures and numbness.   Hematological:  Negative for adenopathy. Does not bruise/bleed easily.   Psychiatric/Behavioral:  Negative for hallucinations and suicidal ideas.    Objective:     Vital Signs (Most Recent):  Temp: 97.8 °F (36.6 °C) (02/23/22 0736)  Pulse: 86 (02/23/22 0736)  Resp: 16 (02/23/22 0736)  BP: 120/62 (02/23/22 0736)  SpO2: (!) 93 % (02/23/22 0736)   Vital Signs (24h Range):  Temp:  [97.8 °F (36.6 °C)-99.4 °F (37.4 °C)] 97.8 °F (36.6 °C)  Pulse:  [63-91] 86  Resp:  [16-18] 16  SpO2:  [93 %-98 %] 93 %  BP: (117-138)/(58-68) 120/62     Weight: 125 kg (275 lb 9.2 oz)  Body mass index is 41.9 kg/m².    Intake/Output Summary (Last 24 hours) at 2/23/2022 1025  Last data filed at 2/22/2022 1233  Gross per 24 hour   Intake 698.75 ml   Output --   Net 698.75 ml      Physical Exam  Constitutional:       Appearance: He is well-developed.   HENT:      Head: Normocephalic and atraumatic.   Eyes:      Conjunctiva/sclera: Conjunctivae normal.      Pupils: Pupils are equal, round, and reactive to light.   Neck:      Thyroid: No thyromegaly.      Vascular: No JVD.   Cardiovascular:      Rate and Rhythm: Normal rate and regular rhythm.      Heart  sounds: Normal heart sounds. No murmur heard.    No friction rub. No gallop.   Pulmonary:      Effort: Pulmonary effort is normal. No respiratory distress.      Breath sounds: Normal breath sounds. No wheezing or rales.   Abdominal:      General: Bowel sounds are normal. There is no distension.      Palpations: Abdomen is soft. There is no mass.      Tenderness: There is no abdominal tenderness. There is no guarding or rebound.      Hernia: No hernia is present.   Musculoskeletal:         General: Deformity (L hand, wrapped in ace bandage) present. Normal range of motion.      Cervical back: Normal range of motion and neck supple.      Right lower leg: Edema (mild) present.      Left lower leg: Edema (mild) present.   Skin:     General: Skin is warm and dry.   Neurological:      Mental Status: He is alert and oriented to person, place, and time.      Cranial Nerves: No cranial nerve deficit.   Psychiatric:         Behavior: Behavior normal.       Significant Labs: All pertinent labs within the past 24 hours have been reviewed.  Bilirubin:   Recent Labs   Lab 02/21/22  1335 02/22/22  0316 02/23/22  0724   BILITOT 3.4* 4.0* 3.5*     CBC:   Recent Labs   Lab 02/21/22  1335 02/22/22  0316 02/23/22  0724   WBC 8.04 7.88 5.54   HGB 12.4* 10.9* 10.3*   HCT 36.2* 31.6* 30.8*   PLT 59* 40* 47*     CMP:   Recent Labs   Lab 02/21/22  1335 02/22/22  0316 02/23/22  0724    142 141   K 4.1 3.8 3.7    107 107   CO2 21* 25 25   * 130* 88   BUN 10 17 12   CREATININE 0.9 0.9 0.8   CALCIUM 8.4* 8.4* 8.4*   PROT 6.8 6.5 6.2   ALBUMIN 2.9* 2.7* 2.7*   BILITOT 3.4* 4.0* 3.5*   ALKPHOS 77 78 78   AST 88* 81* 72*   ALT 33 26 25   ANIONGAP 14 10 9   EGFRNONAA >60.0 >60.0 >60.0       Significant Imaging: I have reviewed all pertinent imaging results/findings within the past 24 hours.      Assessment/Plan:      * Laceration of left middle finger without foreign body  Seen by orthopedics in ED, patient had wound irrigated  and sutured with nail removal.   - Continue Bactrim  - PT/OT recommending SNF placement      Thrombocytopenia  Unclear baseline prior to arrival at [place] but patient has been <100 consistently. No evidence of bleeding and no morphological evidence of cirrhosis on imaging. Patient does however carry hx of excessive etoh use which is likely the cause of his thrombocytopenia  - CTM, transfuse platelets if <10k or <50k with active bleeding.      Macrocytic anemia  Secondary to excessive ETOH use  - Check folate/b12  - no indication for transfusion at this time.      Elevated transaminase level  Secondary to etoh use outpatient. Patient also has elevated bilirubin and INR of 1.5.  RUQ US w/o evidence of cirrhosis but patient likely has some underlying fibrosis given the severity of his alcohol use prior over the past 7 years.   - Maddreys of 30, no indication for steroids currently  - CTM liver enzymes/bili/inr      Alcohol use  Patient with low CIWAs on admission. He has required minimal benzos. Low concern for active withdrawal as he has >36 hr since last drink at this time  - CTM for symptoms  - Recommended patient enlist in AA following discharge from SNF.    HTN (hypertension)  -he is on benazepril and amlodipine at home.  Will continue antihypertensives        VTE Risk Mitigation (From admission, onward)         Ordered     Reason for No Pharmacological VTE Prophylaxis  Once        Question:  Reasons:  Answer:  Risk of Bleeding    02/22/22 0108     IP VTE HIGH RISK PATIENT  Once         02/22/22 0108     Place sequential compression device  Until discontinued         02/22/22 0108                Discharge Planning   JT: 2/25/2022     Code Status: Full Code   Is the patient medically ready for discharge?:     Reason for patient still in hospital (select all that apply): Patient trending condition and Pending disposition                     Cheryl Martin MD  Department of Hospital Medicine   [facility]ie Florian -  [facility]

## 2022-02-23 NOTE — PLAN OF CARE
POC reviewed with patient. Vitals stable within shift. Patient received TB test on left arm at 1440. No complaints of pain or discomfort. Patient's respirations are even and unlabored. Continues to void with urinal.

## 2022-02-23 NOTE — ASSESSMENT & PLAN NOTE
Patient with low CIWAs on admission. He has required minimal benzos. Low concern for active withdrawal as he has >36 hr since last drink at this time  - CTM for symptoms  - Recommended patient enlist in AA following discharge from SNF.

## 2022-02-23 NOTE — PLAN OF CARE
[facility]  Initial Discharge Assessment       Primary Care Provider: Selvin Mathews MD    Admission Diagnosis: Hip pain [M25.559]  Laceration of left ring finger, foreign body presence unspecified, nail damage status unspecified, initial encounter [S61.215A]    Admission Date: 2/21/2022  Expected Discharge Date: 2/25/2022    Discharge Barriers Identified: None    Payor: HU[employer] MANAGED MEDICARE / Plan: HU[employer] MEDICARE HMO / Product Type: Capitation /     No emergency contact information on file.    Discharge Plan A: Skilled Nursing Facility  Discharge Plan B: Skilled Nursing Facility      [facility] - [place], [place] - [address]  [address]  [place] [address]  Phone: [phone] Fax: [phone]      Initial Assessment (most recent)     Adult Discharge Assessment - 02/23/22 1342        Discharge Assessment    Assessment Type Discharge Planning Assessment     Confirmed/corrected address, phone number and insurance Yes     Confirmed Demographics Correct on Facesheet     Source of Information patient     Reason For Admission laceration of left middle finger     Lives With other (see comments)   roommate Colin Willis [phone]    Facility Arrived From: [facility]     Do you expect to return to your current living situation? Yes     Do you have help at home or someone to help you manage your care at home? No   pt has a roommate but he is 89 yo    Prior to hospitilization cognitive status: Alert/Oriented     Current cognitive status: Alert/Oriented     Walking or Climbing Stairs Difficulty ambulation difficulty, requires equipment     Dressing/Bathing Difficulty bathing difficulty, requires equipment     Equipment Currently Used at Home walker, rolling;cane, straight;wheelchair     Do you currently have service(s) that help you manage your care at home? No     Who is going to help you get home at discharge? unknown, possibly roomate     How do you get to doctors appointments? car,  drives self     Are you on dialysis? No     Do you take coumadin? No     Discharge Plan A Skilled Nursing Facility     Discharge Plan B Skilled Nursing Facility     DME Needed Upon Discharge  other (see comments)   TBD    Discharge Barriers Identified None               CM spoke with patient in 1055 for DISCHARGE PLANNING ASSESSMENT. Per patient, he lives with a roommate in a single family, elevator-accessed home with 0 steps to porch and point of entry.  Patient was independent with ADLS and DID use DME or in-home assistive equipment (see above).  Pt is not on dialysis or Coumadin, takes medications as prescribed / keeps refilled / has resources for all daily and prescriptive needs.  Preferred pharmacy is [facility]Agreeable to bedside delivery.  Will have help from possibly rommate and other immediate family upon discharge.  Unit and CM direct numbers provided.  Will continue to follow for course of hospitalization.    *Met with pt at bedside, agreeable to PT/OT SNF recs.  Will continue to follow for discharge needs.        Anisa Napoles RN CM  [id number]  Case Management

## 2022-02-23 NOTE — PLAN OF CARE
02/23/22 1351   Post-Acute Status   Post-Acute Authorization Placement   Post-Acute Placement Status Referrals Sent   Pt has no preference for SNF placement. SW sent referrals to [facility] and [facility].    UPDATE:2:49 PM  SW received call from Oma at [facility]. They stated pt looks appropriate and they are submitting to pt's insurance.    Luiza Humphrey LMSW  [facility]  [phone]

## 2022-02-23 NOTE — SUBJECTIVE & OBJECTIVE
Interval History: No acute events overnight. Patient this AM states that he is in good spirits. He feels less tremulous than when he first came in. He is excited to learn that he doesn't have visible scarring of his liver. He states that he plans on quitting etoh altogether after he leaves a SNF.    Review of Systems   Constitutional:  Negative for chills and fever.   HENT:  Negative for congestion and sore throat.    Eyes:  Negative for photophobia and visual disturbance.   Respiratory:  Negative for cough and shortness of breath.    Cardiovascular:  Negative for chest pain and palpitations.   Gastrointestinal:  Negative for abdominal pain, blood in stool, constipation, diarrhea, nausea and vomiting.   Endocrine: Negative for cold intolerance and heat intolerance.   Genitourinary:  Negative for dysuria and hematuria.   Musculoskeletal:  Positive for arthralgias (L hand pain). Negative for myalgias.   Skin:  Negative for rash and wound.   Allergic/Immunologic: Negative for environmental allergies and food allergies.   Neurological:  Negative for seizures and numbness.   Hematological:  Negative for adenopathy. Does not bruise/bleed easily.   Psychiatric/Behavioral:  Negative for hallucinations and suicidal ideas.    Objective:     Vital Signs (Most Recent):  Temp: 97.8 °F (36.6 °C) (02/23/22 0736)  Pulse: 86 (02/23/22 0736)  Resp: 16 (02/23/22 0736)  BP: 120/62 (02/23/22 0736)  SpO2: (!) 93 % (02/23/22 0736)   Vital Signs (24h Range):  Temp:  [97.8 °F (36.6 °C)-99.4 °F (37.4 °C)] 97.8 °F (36.6 °C)  Pulse:  [63-91] 86  Resp:  [16-18] 16  SpO2:  [93 %-98 %] 93 %  BP: (117-138)/(58-68) 120/62     Weight: 125 kg (275 lb 9.2 oz)  Body mass index is 41.9 kg/m².    Intake/Output Summary (Last 24 hours) at 2/23/2022 1025  Last data filed at 2/22/2022 1233  Gross per 24 hour   Intake 698.75 ml   Output --   Net 698.75 ml      Physical Exam  Constitutional:       Appearance: He is well-developed.   HENT:      Head:  Normocephalic and atraumatic.   Eyes:      Conjunctiva/sclera: Conjunctivae normal.      Pupils: Pupils are equal, round, and reactive to light.   Neck:      Thyroid: No thyromegaly.      Vascular: No JVD.   Cardiovascular:      Rate and Rhythm: Normal rate and regular rhythm.      Heart sounds: Normal heart sounds. No murmur heard.    No friction rub. No gallop.   Pulmonary:      Effort: Pulmonary effort is normal. No respiratory distress.      Breath sounds: Normal breath sounds. No wheezing or rales.   Abdominal:      General: Bowel sounds are normal. There is no distension.      Palpations: Abdomen is soft. There is no mass.      Tenderness: There is no abdominal tenderness. There is no guarding or rebound.      Hernia: No hernia is present.   Musculoskeletal:         General: Deformity (L hand, wrapped in ace bandage) present. Normal range of motion.      Cervical back: Normal range of motion and neck supple.      Right lower leg: Edema (mild) present.      Left lower leg: Edema (mild) present.   Skin:     General: Skin is warm and dry.   Neurological:      Mental Status: He is alert and oriented to person, place, and time.      Cranial Nerves: No cranial nerve deficit.   Psychiatric:         Behavior: Behavior normal.       Significant Labs: All pertinent labs within the past 24 hours have been reviewed.  Bilirubin:   Recent Labs   Lab 02/21/22  1335 02/22/22  0316 02/23/22  0724   BILITOT 3.4* 4.0* 3.5*     CBC:   Recent Labs   Lab 02/21/22  1335 02/22/22  0316 02/23/22  0724   WBC 8.04 7.88 5.54   HGB 12.4* 10.9* 10.3*   HCT 36.2* 31.6* 30.8*   PLT 59* 40* 47*     CMP:   Recent Labs   Lab 02/21/22  1335 02/22/22  0316 02/23/22  0724    142 141   K 4.1 3.8 3.7    107 107   CO2 21* 25 25   * 130* 88   BUN 10 17 12   CREATININE 0.9 0.9 0.8   CALCIUM 8.4* 8.4* 8.4*   PROT 6.8 6.5 6.2   ALBUMIN 2.9* 2.7* 2.7*   BILITOT 3.4* 4.0* 3.5*   ALKPHOS 77 78 78   AST 88* 81* 72*   ALT 33 26 25    ANIONGAP 14 10 9   EGFRNONAA >60.0 >60.0 >60.0       Significant Imaging: I have reviewed all pertinent imaging results/findings within the past 24 hours.

## 2022-02-23 NOTE — PLAN OF CARE
Patient did well overnight, no acute issues to report. CIWA scale score is 3 this morning. Last night patient scored 5 because he was very anxious with tremors noted to be severe enough that patient couldn't hold his own cup of water. I noted 5 mg dose of valium given around 2000 but doesn't appear to be effective. Notified MD who increased dose to 10 mg and was effective. Patient slept and tremors aren't as severe this morning. Not anxious.    Monitoring closely for s/s of withdrawal. On tele, NSR. Vitals stable. No pain reported. Dressing to left hand C/D/I. S/L to right ac 20g. Report given to day shift RN and patient left in bed with side rails up x3 and call light in reach.

## 2022-02-23 NOTE — ASSESSMENT & PLAN NOTE
Unclear baseline prior to arrival at [place] but patient has been <100 consistently. No evidence of bleeding and no morphological evidence of cirrhosis on imaging. Patient does however carry hx of excessive etoh use which is likely the cause of his thrombocytopenia  - CTM, transfuse platelets if <10k or <50k with active bleeding.

## 2022-02-23 NOTE — ASSESSMENT & PLAN NOTE
Secondary to excessive ETOH use  - Check folate/b12  - no indication for transfusion at this time.

## 2022-02-23 NOTE — ASSESSMENT & PLAN NOTE
Seen by orthopedics in ED, patient had wound irrigated and sutured with nail removal.   - Continue Bactrim  - PT/OT recommending SNF placement

## 2022-02-23 NOTE — ASSESSMENT & PLAN NOTE
Secondary to etoh use outpatient. Patient also has elevated bilirubin and INR of 1.5.  RUQ US w/o evidence of cirrhosis but patient likely has some underlying fibrosis given the severity of his alcohol use prior over the past 7 years.   - Maddreys of 30, no indication for steroids currently  - CTM liver enzymes/bili/inr

## 2022-02-24 VITALS
WEIGHT: 275.56 LBS | TEMPERATURE: 98 F | SYSTOLIC BLOOD PRESSURE: 112 MMHG | BODY MASS INDEX: 41.76 KG/M2 | HEIGHT: 68 IN | HEART RATE: 92 BPM | RESPIRATION RATE: 18 BRPM | DIASTOLIC BLOOD PRESSURE: 60 MMHG | OXYGEN SATURATION: 98 %

## 2022-02-24 LAB
ALBUMIN SERPL BCP-MCNC: 2.6 G/DL (ref 3.5–5.2)
ALP SERPL-CCNC: 72 U/L (ref 55–135)
ALT SERPL W/O P-5'-P-CCNC: 23 U/L (ref 10–44)
ANION GAP SERPL CALC-SCNC: 9 MMOL/L (ref 8–16)
AST SERPL-CCNC: 61 U/L (ref 10–40)
BILIRUB SERPL-MCNC: 2.9 MG/DL (ref 0.1–1)
BUN SERPL-MCNC: 12 MG/DL (ref 8–23)
CALCIUM SERPL-MCNC: 8.1 MG/DL (ref 8.7–10.5)
CHLORIDE SERPL-SCNC: 108 MMOL/L (ref 95–110)
CO2 SERPL-SCNC: 24 MMOL/L (ref 23–29)
CREAT SERPL-MCNC: 0.8 MG/DL (ref 0.5–1.4)
EST. GFR  (AFRICAN AMERICAN): >60 ML/MIN/1.73 M^2
EST. GFR  (NON AFRICAN AMERICAN): >60 ML/MIN/1.73 M^2
GLUCOSE SERPL-MCNC: 88 MG/DL (ref 70–110)
INR PPP: 1.5 (ref 0.8–1.2)
POTASSIUM SERPL-SCNC: 3.4 MMOL/L (ref 3.5–5.1)
PROT SERPL-MCNC: 6 G/DL (ref 6–8.4)
PROTHROMBIN TIME: 14.9 SEC (ref 9–12.5)
SODIUM SERPL-SCNC: 141 MMOL/L (ref 136–145)

## 2022-02-24 PROCEDURE — 99226 PR SUBSEQUENT OBSERVATION CARE,LEVEL III: ICD-10-PCS | Mod: ,,, | Performed by: ORTHOPAEDIC SURGERY

## 2022-02-24 PROCEDURE — 94760 N-INVAS EAR/PLS OXIMETRY 1: CPT

## 2022-02-24 PROCEDURE — 99217 PR OBSERVATION CARE DISCHARGE: ICD-10-PCS | Mod: 95,ICN,, | Performed by: INTERNAL MEDICINE

## 2022-02-24 PROCEDURE — 25000003 PHARM REV CODE 250: Performed by: INTERNAL MEDICINE

## 2022-02-24 PROCEDURE — G0378 HOSPITAL OBSERVATION PER HR: HCPCS

## 2022-02-24 PROCEDURE — 85610 PROTHROMBIN TIME: CPT | Performed by: STUDENT IN AN ORGANIZED HEALTH CARE EDUCATION/TRAINING PROGRAM

## 2022-02-24 PROCEDURE — 99217 PR OBSERVATION CARE DISCHARGE: CPT | Mod: 95,ICN,, | Performed by: INTERNAL MEDICINE

## 2022-02-24 PROCEDURE — 80053 COMPREHEN METABOLIC PANEL: CPT | Performed by: STUDENT IN AN ORGANIZED HEALTH CARE EDUCATION/TRAINING PROGRAM

## 2022-02-24 PROCEDURE — 99226 PR SUBSEQUENT OBSERVATION CARE,LEVEL III: CPT | Mod: ,,, | Performed by: ORTHOPAEDIC SURGERY

## 2022-02-24 PROCEDURE — 25000003 PHARM REV CODE 250: Performed by: STUDENT IN AN ORGANIZED HEALTH CARE EDUCATION/TRAINING PROGRAM

## 2022-02-24 PROCEDURE — 36415 COLL VENOUS BLD VENIPUNCTURE: CPT | Performed by: STUDENT IN AN ORGANIZED HEALTH CARE EDUCATION/TRAINING PROGRAM

## 2022-02-24 RX ORDER — LIDOCAINE 50 MG/G
2 PATCH TOPICAL NIGHTLY
Refills: 0
Start: 2022-02-24

## 2022-02-24 RX ORDER — POTASSIUM CHLORIDE 20 MEQ/1
40 TABLET, EXTENDED RELEASE ORAL ONCE
Status: COMPLETED | OUTPATIENT
Start: 2022-02-24 | End: 2022-02-24

## 2022-02-24 RX ORDER — LANOLIN ALCOHOL/MO/W.PET/CERES
100 CREAM (GRAM) TOPICAL DAILY
Start: 2022-02-25

## 2022-02-24 RX ORDER — ACETAMINOPHEN 325 MG/1
650 TABLET ORAL EVERY 6 HOURS PRN
Refills: 0
Start: 2022-02-24

## 2022-02-24 RX ORDER — FOLIC ACID 1 MG/1
1 TABLET ORAL DAILY
Refills: 0
Start: 2022-02-25 | End: 2023-02-25

## 2022-02-24 RX ORDER — SULFAMETHOXAZOLE AND TRIMETHOPRIM 800; 160 MG/1; MG/1
1 TABLET ORAL 2 TIMES DAILY
Qty: 28 TABLET | Refills: 0
Start: 2022-02-24 | End: 2022-03-08

## 2022-02-24 RX ADMIN — POTASSIUM CHLORIDE 40 MEQ: 1500 TABLET, EXTENDED RELEASE ORAL at 10:02

## 2022-02-24 RX ADMIN — Medication 100 MG: at 08:02

## 2022-02-24 RX ADMIN — FOLIC ACID 1 MG: 1 TABLET ORAL at 08:02

## 2022-02-24 RX ADMIN — LISINOPRIL 40 MG: 20 TABLET ORAL at 08:02

## 2022-02-24 RX ADMIN — AMLODIPINE BESYLATE 5 MG: 5 TABLET ORAL at 08:02

## 2022-02-24 RX ADMIN — Medication 1 TABLET: at 08:02

## 2022-02-24 RX ADMIN — SULFAMETHOXAZOLE AND TRIMETHOPRIM 1 TABLET: 800; 160 TABLET ORAL at 08:02

## 2022-02-24 NOTE — PT/OT/SLP PROGRESS
Physical Therapy  Pt Not Seen    Patient Name:  Juan Hatch   MRN:  [id number]    4:00 pm  Patient not seen today secondary to  Other (Comment) (discharge orders in chart). Will follow-up on next scheduled visit if not discharged from hospital.    Ivon Garcia, PTA  2/24/2022

## 2022-02-24 NOTE — PT/OT/SLP PROGRESS
Occupational Therapy      Patient Name:  Juan Hatch   MRN:  [id number]    Patient not seen today secondary to  (discharge orders in Epic.  Will see on 2/25/2022 if not discharge from hospital as planned.).     2/24/2022

## 2022-02-24 NOTE — CONSULTS
[facility] Medicine  Telemedicine Consult Note    Patient Name: Juan Hatch  MRN: [id number]  Admission Date: 2/21/2022  Hospital Length of Stay: 0 days  Attending Physician: Cheryl Martin MD   Primary Care Provider: Selvin Mathews MD     Thank you for your consult to [facility]. We have reviewed the patient chart. This patient does meet criteria for [facility] service at this time. Will assume care on 02/24/22 at 7AM.        Jenny Riley MD  Department of Hospital Medicine   [facility]

## 2022-02-24 NOTE — PLAN OF CARE
02/24/22 0829   Post-Acute Status   Post-Acute Authorization Placement   [facility] received auth on pt for SNF. Team notified and asked for NH orders. Oma in admissions is emailing the forms needed to be completed.    Luiza Humphrey LMSW  [facility]  [phone]

## 2022-02-24 NOTE — PLAN OF CARE
[facility]     Department of Hospital Medicine     [address] [place] [address]     [phone] after hours  [phone] fax       NURSING HOME ORDERS    02/24/2022    Admit to Nursing Home:     Skilled Bed                                                 Diagnoses:  Active Hospital Problems    Diagnosis  POA    *Laceration of left middle finger without foreign body [S61.213A]  Yes    Elevated transaminase level [R74.01]  Yes    Macrocytic anemia [D53.9]  Yes    Thrombocytopenia [D69.6]  Yes    HTN (hypertension) [I10]  Yes    Alcohol use [Z72.89]  Yes      Resolved Hospital Problems   No resolved problems to display.         Allergies:  Review of patient's allergies indicates:   Allergen Reactions    Grass pollen-kellie grass standard        Vitals:       Every shift     Diet:  Diet Cardiac    Supplement:  1 can every three times a day with meals                         Type:  House       Acitivities:      - Up in a chair each morning as tolerated   - Ambulate with assistance to bathroom   - Scheduled walks once each shift (every 8 hours)   - May use walker, cane, or self-propelled wheelchair   - Weight bearing:  WBAT to all extremities     LABS:  CBC, CMP, Mag, Phos twice weekly for 2 weeks then per facility protocol    Nursing Precautions:     - Aspiration precautions:             - Total assistance with meals            -  Upright 90 degrees before, during, and after meals             -  Suction at bedside          - Fall precautions per facility protocol   - Seizure precaution per facility protocol   - Decubitus precautions:        -  for positioning   - Pressure reducing foam mattress   - Turn patient every two hours. Use wedge pillows to anchor patient    CONSULTS:      Physical Therapy to evaluate and treat     Occupational Therapy to evaluate and treat     Speech Therapy  to evaluate and treat     Nutrition to evaluate and recommend  diet    MISCELLANEOUS CARE:       Routine Skin for Bedridden Patients:  Apply moisture barrier cream to all    skin folds and wet areas in perineal area daily and after baths and                           all bowel movements.    Wound care: keep dressing in place to left hand until f/u with Ortho and replace only if soiled.         Medications: Discontinue all previous medication orders, if any. See new list below.    Current Discharge Medication List      START taking these medications    Details   acetaminophen (TYLENOL) 325 MG tablet Take 2 tablets (650 mg total) by mouth every 6 (six) hours as needed for Pain.  Refills: 0      folic acid (FOLVITE) 1 MG tablet Take 1 tablet (1 mg total) by mouth once daily.  Refills: 0      LIDOcaine (LIDODERM) 5 % Place 2 patches onto the skin every evening. To back. Remove & Discard patch within 12 hours or as directed by MD  Refills: 0      sulfamethoxazole-trimethoprim 800-160mg (BACTRIM DS) 800-160 mg Tab Take 1 tablet by mouth 2 (two) times daily. End date: 3/8/2022. for 12 days  Qty: 28 tablet, Refills: 0      thiamine 100 MG tablet Take 1 tablet (100 mg total) by mouth once daily.         CONTINUE these medications which have NOT CHANGED    Details   amLODIPine (NORVASC) 5 MG tablet Take 5 mg by mouth every morning.      benazepriL (LOTENSIN) 40 MG tablet Take 40 mg by mouth every morning.      dext 70/polycarbophil/peg/NaCl (ARTIFICIAL TEAR SOLUTION OPHT) Place 1 drop into both eyes 4 (four) times daily as needed (dry eye).      docusate sodium (STOOL SOFTENER ORAL) Take 1 capsule by mouth daily as needed (constipation).      loratadine (CLARITIN) 10 mg tablet Take 10 mg by mouth once daily.      multivit-min/FA/lycopen/lutein (MEN 50 PLUS MULTIVITAMIN ORAL) Take 1 tablet by mouth once daily.      vitamin B complex (VITAMINS B COMPLEX ORAL) Take 1 tablet by mouth once daily.      vitamin D (VITAMIN D3) 1000 units Tab Take 1,000 Units by mouth once daily.         STOP  taking these medications       aspirin (ECOTRIN) 81 MG EC tablet Comments:   Reason for Stopping:         tetrahydrozoline HCl/zinc sulf (EYE DROPS ALLERGY RELIEF OPHT) Comments:   Reason for Stopping:                    Future Appointments   Date Time Provider Department Center   3/3/2022 12:45 PM KALI LANDRY [facility]   3/3/2022  1:00 PM RAHUL Nair [facility]     Call [facility] with any concerns: [phone]; he was recommended to see ortho f/u as above and they will try to do a virtual visit.    _________________________________  Michelle Roth MD  02/24/2022

## 2022-02-24 NOTE — PLAN OF CARE
02/24/22 1143   Post-Acute Status   Post-Acute Authorization Placement   Post-Acute Placement Status Set-up Complete/Auth obtained   Pt going to [facility]. SW arranged wheelchair transport via [facility]. Requested  time is 12:45 PM. Requested  time does not guarantee arrival time.    Nurse can call report to [phone].   Nurse notified of the above.    Luiza Humphrey LMSW  [facility]  [id number]

## 2022-02-24 NOTE — PLAN OF CARE
Plan is to discharge to [facility] post acute follow up with Ortho to be virtual phone number provided to nursing home on the orders  Rickie SCHNEIDER  Discharge Final Note    Primary Care Provider: Selvin Mathews MD    Expected Discharge Date: 2/24/2022    Final Discharge Note (most recent)     Final Note - 02/24/22 1308        Final Note    Assessment Type Final Discharge Note     Anticipated Discharge Disposition Skilled Nursing Facility     Hospital Resources/Appts/Education Provided Appointments scheduled and added to AVS                 Important Message from Medicare

## 2022-02-26 LAB — BACTERIA BLD CULT: NORMAL

## 2022-02-28 ENCOUNTER — TELEPHONE (OUTPATIENT)
Dept: ORTHOPEDICS | Facility: CLINIC | Age: 72
End: 2022-02-28
Payer: MEDICARE

## 2022-02-28 ENCOUNTER — DOCUMENTATION ONLY (OUTPATIENT)
Dept: ORTHOPEDICS | Facility: CLINIC | Age: 72
End: 2022-02-28
Payer: MEDICARE

## 2022-03-02 ENCOUNTER — TELEPHONE (OUTPATIENT)
Dept: ORTHOPEDICS | Facility: CLINIC | Age: 72
End: 2022-03-02
Payer: MEDICARE

## 2022-03-02 NOTE — TELEPHONE ENCOUNTER
Spoke with Kina ([facility]) and she will try to get transportation setup for pt.  She will call office back regarding this matter.

## 2022-03-02 NOTE — TELEPHONE ENCOUNTER
----- Message from Kianna Hayes MA sent at 3/2/2022  9:33 AM CST -----  Regarding: nursing home returning call  Type:  Patient Returning Call         Who Called: Kina person who schedules appt        Who Left Message for Patient:Jody          Does the patient know what this is regarding? Appt for          Best Call Back Number:[phone]         Additional Information:

## 2022-03-03 ENCOUNTER — DOCUMENTATION ONLY (OUTPATIENT)
Dept: ORTHOPEDICS | Facility: CLINIC | Age: 72
End: 2022-03-03
Payer: MEDICARE

## 2022-03-03 ENCOUNTER — HOSPITAL ENCOUNTER (OUTPATIENT)
Dept: RADIOLOGY | Facility: OTHER | Age: 72
Discharge: HOME | End: 2022-03-03
Attending: FAMILY MEDICINE
Payer: MEDICARE

## 2022-03-03 ENCOUNTER — OFFICE VISIT (OUTPATIENT)
Dept: ORTHOPEDICS | Facility: CLINIC | Age: 72
End: 2022-03-03
Payer: MEDICARE

## 2022-03-03 VITALS — WEIGHT: 275 LBS | HEIGHT: 68 IN | BODY MASS INDEX: 41.68 KG/M2

## 2022-03-03 DIAGNOSIS — R52 PAIN: Primary | ICD-10-CM

## 2022-03-03 DIAGNOSIS — Z01.818 PRE-OP EXAM: Primary | ICD-10-CM

## 2022-03-03 DIAGNOSIS — S68.119A FINGERTIP AMPUTATION, INITIAL ENCOUNTER: Primary | ICD-10-CM

## 2022-03-03 DIAGNOSIS — R52 PAIN: ICD-10-CM

## 2022-03-03 PROCEDURE — 29125 APPL SHORT ARM SPLINT STATIC: CPT | Mod: LT,S$GLB,, | Performed by: PHYSICIAN ASSISTANT

## 2022-03-03 PROCEDURE — 99204 OFFICE O/P NEW MOD 45 MIN: CPT | Mod: 25,S$GLB,, | Performed by: PHYSICIAN ASSISTANT

## 2022-03-03 PROCEDURE — 1125F PR PAIN SEVERITY QUANTIFIED, PAIN PRESENT: ICD-10-PCS | Mod: CPTII,S$GLB,, | Performed by: PHYSICIAN ASSISTANT

## 2022-03-03 PROCEDURE — 73140 XR FINGER 2 OR MORE VIEWS LEFT: ICD-10-PCS | Mod: 26,LT,, | Performed by: RADIOLOGY

## 2022-03-03 PROCEDURE — 1125F AMNT PAIN NOTED PAIN PRSNT: CPT | Mod: CPTII,S$GLB,, | Performed by: PHYSICIAN ASSISTANT

## 2022-03-03 PROCEDURE — 99999 PR PBB SHADOW E&M-EST. PATIENT-LVL IV: CPT | Mod: PBBFAC,,, | Performed by: PHYSICIAN ASSISTANT

## 2022-03-03 PROCEDURE — 99999 PR PBB SHADOW E&M-EST. PATIENT-LVL IV: ICD-10-PCS | Mod: PBBFAC,,, | Performed by: PHYSICIAN ASSISTANT

## 2022-03-03 PROCEDURE — 29125 PR APPLY FOREARM SPLINT,STATIC: ICD-10-PCS | Mod: LT,S$GLB,, | Performed by: PHYSICIAN ASSISTANT

## 2022-03-03 PROCEDURE — 1160F PR REVIEW ALL MEDS BY PRESCRIBER/CLIN PHARMACIST DOCUMENTED: ICD-10-PCS | Mod: CPTII,S$GLB,, | Performed by: PHYSICIAN ASSISTANT

## 2022-03-03 PROCEDURE — 3008F PR BODY MASS INDEX (BMI) DOCUMENTED: ICD-10-PCS | Mod: CPTII,S$GLB,, | Performed by: PHYSICIAN ASSISTANT

## 2022-03-03 PROCEDURE — 1159F MED LIST DOCD IN RCRD: CPT | Mod: CPTII,S$GLB,, | Performed by: PHYSICIAN ASSISTANT

## 2022-03-03 PROCEDURE — 99204 PR OFFICE/OUTPT VISIT, NEW, LEVL IV, 45-59 MIN: ICD-10-PCS | Mod: 25,S$GLB,, | Performed by: PHYSICIAN ASSISTANT

## 2022-03-03 PROCEDURE — 3008F BODY MASS INDEX DOCD: CPT | Mod: CPTII,S$GLB,, | Performed by: PHYSICIAN ASSISTANT

## 2022-03-03 PROCEDURE — 1101F PT FALLS ASSESS-DOCD LE1/YR: CPT | Mod: CPTII,S$GLB,, | Performed by: PHYSICIAN ASSISTANT

## 2022-03-03 PROCEDURE — 3288F FALL RISK ASSESSMENT DOCD: CPT | Mod: CPTII,S$GLB,, | Performed by: PHYSICIAN ASSISTANT

## 2022-03-03 PROCEDURE — 1160F RVW MEDS BY RX/DR IN RCRD: CPT | Mod: CPTII,S$GLB,, | Performed by: PHYSICIAN ASSISTANT

## 2022-03-03 PROCEDURE — 73140 X-RAY EXAM OF FINGER(S): CPT | Mod: 26,LT,, | Performed by: RADIOLOGY

## 2022-03-03 PROCEDURE — 1159F PR MEDICATION LIST DOCUMENTED IN MEDICAL RECORD: ICD-10-PCS | Mod: CPTII,S$GLB,, | Performed by: PHYSICIAN ASSISTANT

## 2022-03-03 PROCEDURE — 3288F PR FALLS RISK ASSESSMENT DOCUMENTED: ICD-10-PCS | Mod: CPTII,S$GLB,, | Performed by: PHYSICIAN ASSISTANT

## 2022-03-03 PROCEDURE — 1101F PR PT FALLS ASSESS DOC 0-1 FALLS W/OUT INJ PAST YR: ICD-10-PCS | Mod: CPTII,S$GLB,, | Performed by: PHYSICIAN ASSISTANT

## 2022-03-03 PROCEDURE — 73140 X-RAY EXAM OF FINGER(S): CPT | Mod: TC,FY,LT

## 2022-03-03 PROCEDURE — 4010F ACE/ARB THERAPY RXD/TAKEN: CPT | Mod: CPTII,S$GLB,, | Performed by: PHYSICIAN ASSISTANT

## 2022-03-03 PROCEDURE — 4010F PR ACE/ARB THEARPY RXD/TAKEN: ICD-10-PCS | Mod: CPTII,S$GLB,, | Performed by: PHYSICIAN ASSISTANT

## 2022-03-03 NOTE — PROGRESS NOTES
Subjective:      Patient ID: Holden Martinez is a 71 y.o. male.    Chief Complaint: Pain of the Left Hand      HPI  Holden Martinez is a right hand dominant 71 y.o. male presenting today for follow-up from the ED.  There was a history of trauma - he reports cutting the left long finger distally after a trip and fall.  Laceration occurred with a metal nail.  Injury occurred 2/20/2022, he was evaluated in the ED at [place] on 02/21/2022 and then transferred to [facility] ED.  he was evaluated by Orthopedics, distal long finger laceration was irrigated and sutured, nail plate was removed and nail bed was stented.  Ancef given in the ED.  He is currently staying at [facility].  Per patient he is receiving Bactrim DS once a day from the skilled nursing facility.  He also reports that during transport to [facility] the hospital dressing on the left long finger got wet, he reports that skilled Nursing refused to change it so he removed it in the night and then they placed a new dressing on the left long finger.  Tetanus was updated at [place] ED.      Review of patient's allergies indicates:   Allergen Reactions    Grass pollen-june grass standard          Current Outpatient Medications   Medication Sig Dispense Refill    acetaminophen (TYLENOL) 325 MG tablet Take 2 tablets (650 mg total) by mouth every 6 (six) hours as needed for Pain.  0    amLODIPine (NORVASC) 5 MG tablet Take 5 mg by mouth every morning.      benazepriL (LOTENSIN) 40 MG tablet Take 40 mg by mouth every morning.      dext 70/polycarbophil/peg/NaCl (ARTIFICIAL TEAR SOLUTION OPHT) Place 1 drop into both eyes 4 (four) times daily as needed (dry eye).      docusate sodium (STOOL SOFTENER ORAL) Take 1 capsule by mouth daily as needed (constipation).      folic acid (FOLVITE) 1 MG tablet Take 1 tablet (1 mg total) by mouth once daily.  0    LIDOcaine (LIDODERM) 5 % Place 2 patches onto the skin every evening. To back. Remove &  "Discard patch within 12 hours or as directed by MD  0    loratadine (CLARITIN) 10 mg tablet Take 10 mg by mouth once daily.      multivit-min/FA/lycopen/lutein (MEN 50 PLUS MULTIVITAMIN ORAL) Take 1 tablet by mouth once daily.      sulfamethoxazole-trimethoprim 800-160mg (BACTRIM DS) 800-160 mg Tab Take 1 tablet by mouth 2 (two) times daily. End date: 3/8/2022. for 12 days 28 tablet 0    thiamine 100 MG tablet Take 1 tablet (100 mg total) by mouth once daily.      vitamin B complex (VITAMINS B COMPLEX ORAL) Take 1 tablet by mouth once daily.      vitamin D (VITAMIN D3) 1000 units Tab Take 1,000 Units by mouth once daily.       No current facility-administered medications for this visit.       Past Medical History:   Diagnosis Date    Herniated lumbar intervertebral disc     Hypertension        History reviewed. No pertinent surgical history.      Review of Systems:  ROS:  Constitutional: no fever or chills  Skin: no rash or suspicious lesions  Musculoskeletal: See HPI.   Neurological: no headaches, lightheadedness, or dizziness. No numbness or tingling  Psychological/behavioral: no anxiety or depression      OBJECTIVE:     PHYSICAL EXAM:  Height: 5' 8" (172.7 cm) Weight: 124.7 kg (275 lb)  Vitals:    03/03/22 1404   Weight: 124.7 kg (275 lb)   Height: 5' 8" (1.727 m)   PainSc:   2     Vitals reviewed.  Constitutional: NAD. Patient appears well-developed and well-nourished.   HENT:   Head: Normocephalic and atraumatic.   Neck: Normal range of motion.   Cardiovascular: Normal rate.    Pulmonary/Chest: Effort normal. No respiratory distress.   Neurological: Patient is awake, alert, oriented.   Psychiatric: Patient has a normal mood and affect. Behavior is normal. Judgment and thought content normal.  Musculoskeletal:  See images.  Dressing removed from the left long finger tip, including the Xeroform gauze over the nail bed.  Nail bed is raw granulation tissue, no laceration noted at the nail bed.  Left long " finger laceration volar over the distal phalanx, skin maceration noted.  No discharge appreciated.  Sutures in place.  Decreased sensation over the distal phalanx of the left long finger.  Good median, ulnar, and radial sensation, good motor function, good capillary refill.  New Adaptic and bacitracin gauze well-padded dressing and radial gutter plaster splint applied.    Dressing at visit arrival:          After dressing removal:                      RADIOGRAPHS:  Left long finger XRay, 3/3/2022  FINDINGS:  There is bandaging material in place about the 3rd digit.  There has been interval amputation of the tuft of the distal phalanx.  Overlying soft tissues show subcutaneous gas and suspected involvement of the nail bed.  No radiopaque foreign body identified.     Impression:  Interval amputation of the tuft of the 3rd distal phalanx with overlying soft tissue deformity.    Comments: I have personally reviewed the imaging and I agree with the above radiologist's report.    ASSESSMENT/PLAN:   Holden was seen today for pain.    Diagnoses and all orders for this visit:    Fingertip amputation, initial encounter           - We talked at length about the anatomy and pathophysiology of   Encounter Diagnosis   Name Primary?    Fingertip amputation, initial encounter Yes       - images sent to Dr. Hernandez for review, discussed with the patient indications and risks of revision amputation of the left long finger.  Per Dr. Hernandez, recommend revision amputation of the left long finger.  His questions were answered, consent form signed today in clinic.  - well-padded radial gutter plaster splint applied; splint to remain in place  - no to skilled nursing facility, patient should be taking Bactrim DS b.i.d.  - fluoroscopy exam with finger tip dressing off  - call with questions or concerns    Disclaimer: This note has been generated using voice-recognition software. There may be typographical errors that have been  missed during proof-reading.

## 2022-03-03 NOTE — PROGRESS NOTES
Patient was late for his XRay and has missed his appt in the hand clinic.  Rescheduled to 3PM today.     oral

## 2022-03-03 NOTE — H&P (VIEW-ONLY)
Subjective:      Patient ID: Holden Martinez is a 71 y.o. male.    Chief Complaint: Pain of the Left Hand      HPI  Holden Martinez is a right hand dominant 71 y.o. male presenting today for follow-up from the ED.  There was a history of trauma - he reports cutting the left long finger distally after a trip and fall.  Laceration occurred with a metal nail.  Injury occurred 2/20/2022, he was evaluated in the ED at [place] on 02/21/2022 and then transferred to [facility].  he was evaluated by Orthopedics, distal long finger laceration was irrigated and sutured, nail plate was removed and nail bed was stented.  Ancef given in the ED.  He is currently staying at [facility].  Per patient he is receiving Bactrim DS once a day from the skilled nursing facility.  He also reports that during transport to [facility] the hospital dressing on the left long finger got wet, he reports that skilled Nursing refused to change it so he removed it in the night and then they placed a new dressing on the left long finger.  Tetanus was updated at [place] ED.      Review of patient's allergies indicates:   Allergen Reactions    Grass pollen-june grass standard          Current Outpatient Medications   Medication Sig Dispense Refill    acetaminophen (TYLENOL) 325 MG tablet Take 2 tablets (650 mg total) by mouth every 6 (six) hours as needed for Pain.  0    amLODIPine (NORVASC) 5 MG tablet Take 5 mg by mouth every morning.      benazepriL (LOTENSIN) 40 MG tablet Take 40 mg by mouth every morning.      dext 70/polycarbophil/peg/NaCl (ARTIFICIAL TEAR SOLUTION OPHT) Place 1 drop into both eyes 4 (four) times daily as needed (dry eye).      docusate sodium (STOOL SOFTENER ORAL) Take 1 capsule by mouth daily as needed (constipation).      folic acid (FOLVITE) 1 MG tablet Take 1 tablet (1 mg total) by mouth once daily.  0    LIDOcaine (LIDODERM) 5 % Place 2 patches onto the skin every evening. To back. Remove &  "Discard patch within 12 hours or as directed by MD  0    loratadine (CLARITIN) 10 mg tablet Take 10 mg by mouth once daily.      multivit-min/FA/lycopen/lutein (MEN 50 PLUS MULTIVITAMIN ORAL) Take 1 tablet by mouth once daily.      sulfamethoxazole-trimethoprim 800-160mg (BACTRIM DS) 800-160 mg Tab Take 1 tablet by mouth 2 (two) times daily. End date: 3/8/2022. for 12 days 28 tablet 0    thiamine 100 MG tablet Take 1 tablet (100 mg total) by mouth once daily.      vitamin B complex (VITAMINS B COMPLEX ORAL) Take 1 tablet by mouth once daily.      vitamin D (VITAMIN D3) 1000 units Tab Take 1,000 Units by mouth once daily.       No current facility-administered medications for this visit.       Past Medical History:   Diagnosis Date    Herniated lumbar intervertebral disc     Hypertension        History reviewed. No pertinent surgical history.      Review of Systems:  ROS:  Constitutional: no fever or chills  Skin: no rash or suspicious lesions  Musculoskeletal: See HPI.   Neurological: no headaches, lightheadedness, or dizziness. No numbness or tingling  Psychological/behavioral: no anxiety or depression      OBJECTIVE:     PHYSICAL EXAM:  Height: 5' 8" (172.7 cm) Weight: 124.7 kg (275 lb)  Vitals:    03/03/22 1404   Weight: 124.7 kg (275 lb)   Height: 5' 8" (1.727 m)   PainSc:   2     Vitals reviewed.  Constitutional: NAD. Patient appears well-developed and well-nourished.   HENT:   Head: Normocephalic and atraumatic.   Neck: Normal range of motion.   Cardiovascular: Normal rate.    Pulmonary/Chest: Effort normal. No respiratory distress.   Neurological: Patient is awake, alert, oriented.   Psychiatric: Patient has a normal mood and affect. Behavior is normal. Judgment and thought content normal.  Musculoskeletal:  See images.  Dressing removed from the left long finger tip, including the Xeroform gauze over the nail bed.  Nail bed is raw granulation tissue, no laceration noted at the nail bed.  Left long " finger laceration volar over the distal phalanx, skin maceration noted.  No discharge appreciated.  Sutures in place.  Decreased sensation over the distal phalanx of the left long finger.  Good median, ulnar, and radial sensation, good motor function, good capillary refill.  New Adaptic and bacitracin gauze well-padded dressing and radial gutter plaster splint applied.    Dressing at visit arrival:          After dressing removal:                      RADIOGRAPHS:  Left long finger XRay, 3/3/2022  FINDINGS:  There is bandaging material in place about the 3rd digit.  There has been interval amputation of the tuft of the distal phalanx.  Overlying soft tissues show subcutaneous gas and suspected involvement of the nail bed.  No radiopaque foreign body identified.     Impression:  Interval amputation of the tuft of the 3rd distal phalanx with overlying soft tissue deformity.    Comments: I have personally reviewed the imaging and I agree with the above radiologist's report.    ASSESSMENT/PLAN:   Holden was seen today for pain.    Diagnoses and all orders for this visit:    Fingertip amputation, initial encounter           - We talked at length about the anatomy and pathophysiology of   Encounter Diagnosis   Name Primary?    Fingertip amputation, initial encounter Yes       - images sent to Dr. Hernandez for review, discussed with the patient indications and risks of revision amputation of the left long finger.  Per Dr. Hernandez, recommend revision amputation of the left long finger.  His questions were answered, consent form signed today in clinic.  - well-padded radial gutter plaster splint applied; splint to remain in place  - no to skilled nursing facility, patient should be taking Bactrim DS b.i.d.  - fluoroscopy exam with finger tip dressing off  - call with questions or concerns    Disclaimer: This note has been generated using voice-recognition software. There may be typographical errors that have been  missed during proof-reading.

## 2022-03-04 ENCOUNTER — TELEPHONE (OUTPATIENT)
Dept: ORTHOPEDICS | Facility: CLINIC | Age: 72
End: 2022-03-04
Payer: MEDICARE

## 2022-03-04 ENCOUNTER — ANESTHESIA EVENT (OUTPATIENT)
Dept: SURGERY | Facility: HOSPITAL | Age: 72
End: 2022-03-04
Payer: MEDICARE

## 2022-03-04 DIAGNOSIS — M79.645 PAIN OF LEFT MIDDLE FINGER: ICD-10-CM

## 2022-03-04 DIAGNOSIS — S68.119A FINGERTIP AMPUTATION, INITIAL ENCOUNTER: Primary | ICD-10-CM

## 2022-03-04 DIAGNOSIS — Z98.890 POST-OPERATIVE STATE: Primary | ICD-10-CM

## 2022-03-04 RX ORDER — IBUPROFEN 600 MG/1
600 TABLET ORAL 3 TIMES DAILY PRN
Qty: 45 TABLET | Refills: 0 | Status: SHIPPED | OUTPATIENT
Start: 2022-03-04

## 2022-03-04 RX ORDER — OXYCODONE AND ACETAMINOPHEN 5; 325 MG/1; MG/1
1 TABLET ORAL EVERY 6 HOURS PRN
Qty: 10 TABLET | Refills: 0 | Status: SHIPPED | OUTPATIENT
Start: 2022-03-04 | End: 2022-07-10

## 2022-03-04 NOTE — TELEPHONE ENCOUNTER
Attempted to contact pt. Phone is not currently in service.     ==  Pt is currently residing at [facility], [place], [phone]. Spoke mio nurse managerLoretta. Informed her that pt needs COVID test today. She stated pt can be rapid tested at their facility, PCR also done but results take 48-72 hours. She stated she has not information regarding pt's surgery Monday. Advised that I can fax the information to her but pt was given surgery information at appointment yesterday. She asked that information be faxed to [phone]. She then transferred me to Kian Seay who over sees transportation. Left Kina SNOW, informing her that I have pt scheduled today for COVID test to [facility] at 1600, also provided location. Asked that she call me back ASAP.     ==  Spoke mio Brennan, [facility] Charge RN. Explained pt's situation, she stated that pt can be rapid tested DOS at [facility].     ==  Spoke mio Reddy at [facility]. She is pt's . She stated that they are not able to provide transportation for pt to get COVID test today. Advised that following my initial conversation mio Burgos, I confirmed c our surgery center that pt can undergo DOS COVID test. Advised that I will have pt's arrival time by 1000 & will fax all pertinent surgery information at that time to [phone].

## 2022-03-04 NOTE — TELEPHONE ENCOUNTER
Faxed surgery information to [facility],   [phone] ATTN Maureen Burgos & Kina. Confirmation received 1030 03/04/22.     ==  Attempted to contact pt's Maureen at [facility], [phone]. Left voicemail. Informed of 1200 arrival time for 03/07/22 surgery at the [facility], NPO status. Asked Maureen to return call to clinic at [phone] to confirm.    ==  Attempted to contact Kina,  [facility], [phone]. Informed her of pt's 1200 arrival time for surgery 03/07/22. Asked Kina to return call to clinic at [phone] to confirm.

## 2022-03-04 NOTE — TELEPHONE ENCOUNTER
Spoke mio Reddy at [facility], [phone]. She confirmed that my fax & voicemail were received. Informed her of pt's 1200 arrival time for 03/07/22 surgery at the [facility]. Reminded of NPO status. She expressed understanding & was thankful.

## 2022-03-05 NOTE — ASSESSMENT & PLAN NOTE
Secondary to etoh use outpatient. Patient also has elevated bilirubin and INR of 1.5.  RUQ US w/o evidence of cirrhosis but patient likely has some underlying fibrosis given the severity of his alcohol use prior over the past 7 years.   - Maddreys of 30, no indication for steroids currently  - CTM liver enzymes/bili/inr - improving at discharge

## 2022-03-05 NOTE — ASSESSMENT & PLAN NOTE
Patient with low CIWAs on admission. He has required minimal benzos. Low concern for active withdrawal as he has >36 hr since last drink at this time  - monitored for symptoms which improved rapidly  - Recommended patient enlist in AA following discharge from SNF.

## 2022-03-05 NOTE — DISCHARGE SUMMARY
[facility] Medicine  Discharge Summary      Patient Name: Holden Martinez  MRN: [id number]  Patient Class: OP- Observation  Admission Date: 2/21/2022  Hospital Length of Stay: 0 days  Discharge Date and Time: 2/24/2022  3:47 PM  Attending Physician: No att. providers found   Discharging Provider: Michelle Roth MD  Primary Care Provider: Selvin Mathews MD      HPI:   Mr. Hatch 71-year-old male with a history of hypertension and herniated disc who was transferred to [facility] emergency department from [facility] for a laceration to his left 3rd finger.  He noted he was walking and slipped hitting his left hand on a metal chair.  That occurred  between midnight and 2:00 a.m. on the morning of presentation.  He noted his last alcohol use had been around 8:00 p.m. the night prior.  He had no head trauma and no loss of consciousness with the fall.  He was taken to [facility] where he was found to have evidence of a distal laceration of the finger tip.  He was transferred here for Orthopedic Hand Surgery evaluation.  He underwent repair of the laceration.  Patient reported he received Tdap prior to transfer from [place].  He received antibiotics at the outside facility and Bactrim in the emergency department here.  Of note, he had some tremor and anxiety.  He reports that since around Valentine's he has been drinking 3-4 beers and 1 alcoholic drink per day.  He received a dose of Valium in the emergency department and noted that he felt better.  Hospital Medicine was asked to admit for further treatment.      * No surgery found *      Hospital Course:   Patient has very mild withdrawal on exam ED so placed in observation. Tremors improved rapidly.  Patient discharged to SNF near his home in [place] for debility.  Initially, followup will need to be virtually until discharged from SNF.       Goals of Care Treatment Preferences:  Code Status: Full Code      Consults:   Consults (From  admission, onward)        Status Ordering Provider     Inpatient virtual consult to Hospital Medicine  Once        Provider:  (Not yet assigned)    Completed AUGIE ARMAS     Inpatient consult to Orthopedics  Once        Provider:  (Not yet assigned)    Completed GENO ALCANTAR          * Laceration of left middle finger without foreign body  Seen by orthopedics in ED, patient had wound irrigated and sutured with nail removal.   - Continue Bactrim  - PT/OT recommending SNF placement      Thrombocytopenia  Unclear baseline prior to arrival at [place] but patient has been <100 consistently. No evidence of bleeding and no morphological evidence of cirrhosis on imaging. Patient does however carry hx of excessive etoh use which is likely the cause of his thrombocytopenia  - CTM, transfuse platelets if <10k or <50k with active bleeding.      Macrocytic anemia  Secondary to excessive ETOH use  - Check folate/b12  - no indication for transfusion at this time.      Elevated transaminase level  Secondary to etoh use outpatient. Patient also has elevated bilirubin and INR of 1.5.  RUQ US w/o evidence of cirrhosis but patient likely has some underlying fibrosis given the severity of his alcohol use prior over the past 7 years.   - Maddreys of 30, no indication for steroids currently  - CTM liver enzymes/bili/inr - improving at discharge      Alcohol use  Patient with low CIWAs on admission. He has required minimal benzos. Low concern for active withdrawal as he has >36 hr since last drink at this time  - monitored for symptoms which improved rapidly  - Recommended patient enlist in AA following discharge from SNF.    HTN (hypertension)  -he is on benazepril and amlodipine at home.  Will continue antihypertensives        Final Active Diagnoses:    Diagnosis Date Noted POA    PRINCIPAL PROBLEM:  Laceration of left middle finger without foreign body [S61.213A] 02/21/2022 Yes    Elevated transaminase level [R74.01]  02/23/2022 Yes    Macrocytic anemia [D53.9] 02/23/2022 Yes    Thrombocytopenia [D69.6] 02/23/2022 Yes    HTN (hypertension) [I10] 02/22/2022 Yes    Alcohol use [Z72.89] 02/22/2022 Yes      Problems Resolved During this Admission:       Discharged Condition: stable    Disposition: Skilled Nursing Facility    Follow Up:    Patient Instructions:   No discharge procedures on file.    Significant Diagnostic Studies: as above    Pending Diagnostic Studies:     None         Medications:  Reconciled Home Medications:      Medication List      START taking these medications    acetaminophen 325 MG tablet  Commonly known as: TYLENOL  Take 2 tablets (650 mg total) by mouth every 6 (six) hours as needed for Pain.     folic acid 1 MG tablet  Commonly known as: FOLVITE  Take 1 tablet (1 mg total) by mouth once daily.     LIDOcaine 5 %  Commonly known as: LIDODERM  Place 2 patches onto the skin every evening. To back. Remove & Discard patch within 12 hours or as directed by MD     sulfamethoxazole-trimethoprim 800-160mg 800-160 mg Tab  Commonly known as: BACTRIM DS  Take 1 tablet by mouth 2 (two) times daily. End date: 3/8/2022. for 12 days     thiamine 100 MG tablet  Take 1 tablet (100 mg total) by mouth once daily.        CONTINUE taking these medications    amLODIPine 5 MG tablet  Commonly known as: NORVASC  Take 5 mg by mouth every morning.     ARTIFICIAL TEAR SOLUTION OPHT  Place 1 drop into both eyes 4 (four) times daily as needed (dry eye).     benazepriL 40 MG tablet  Commonly known as: LOTENSIN  Take 40 mg by mouth every morning.     loratadine 10 mg tablet  Commonly known as: CLARITIN  Take 10 mg by mouth once daily.     MEN 50 PLUS MULTIVITAMIN ORAL  Take 1 tablet by mouth once daily.     STOOL SOFTENER ORAL  Take 1 capsule by mouth daily as needed (constipation).     vitamin D 1000 units Tab  Commonly known as: VITAMIN D3  Take 1,000 Units by mouth once daily.     VITAMINS B COMPLEX ORAL  Take 1 tablet by mouth  once daily.        STOP taking these medications    aspirin 81 MG EC tablet  Commonly known as: ECOTRIN     EYE DROPS ALLERGY RELIEF OPHT            Indwelling Lines/Drains at time of discharge:   Lines/Drains/Airways     None                 Time spent on the discharge of patient: 42 minutes    The attending portion of this evaluation, treatment, and documentation was performed per Michelle Roth MD via Telemedicine AudioVisual using the secure [employer] software platform with 2 way audio/video. The provider was located off-site and the patient is located in the hospital. The aforementioned video software was utilized to document the relevant history and physical exam    Michelle Roth MD  Department of Hospital Medicine  [facility]

## 2022-03-05 NOTE — ASSESSMENT & PLAN NOTE
Unclear baseline prior to arrival at [facility] but patient has been <100 consistently. No evidence of bleeding and no morphological evidence of cirrhosis on imaging. Patient does however carry hx of excessive etoh use which is likely the cause of his thrombocytopenia  - CTM, transfuse platelets if <10k or <50k with active bleeding.

## 2022-03-07 ENCOUNTER — ANESTHESIA (OUTPATIENT)
Dept: SURGERY | Facility: HOSPITAL | Age: 72
End: 2022-03-07
Payer: MEDICARE

## 2022-03-07 ENCOUNTER — HOSPITAL ENCOUNTER (OUTPATIENT)
Facility: HOSPITAL | Age: 72
Discharge: HOME | End: 2022-03-07
Attending: ORTHOPAEDIC SURGERY | Admitting: ORTHOPAEDIC SURGERY
Payer: MEDICARE

## 2022-03-07 VITALS
OXYGEN SATURATION: 97 % | HEART RATE: 98 BPM | RESPIRATION RATE: 18 BRPM | BODY MASS INDEX: 41.68 KG/M2 | TEMPERATURE: 98 F | HEIGHT: 68 IN | DIASTOLIC BLOOD PRESSURE: 80 MMHG | WEIGHT: 275 LBS | SYSTOLIC BLOOD PRESSURE: 121 MMHG

## 2022-03-07 DIAGNOSIS — S68.119A FINGERTIP AMPUTATION, INITIAL ENCOUNTER: Primary | ICD-10-CM

## 2022-03-07 DIAGNOSIS — S69.92XD FINGER INJURY, LEFT, SUBSEQUENT ENCOUNTER: ICD-10-CM

## 2022-03-07 LAB
CTP QC/QA: YES
SARS-COV-2 AG RESP QL IA.RAPID: NEGATIVE

## 2022-03-07 PROCEDURE — 87102 FUNGUS ISOLATION CULTURE: CPT | Performed by: ORTHOPAEDIC SURGERY

## 2022-03-07 PROCEDURE — 36000706: Performed by: ORTHOPAEDIC SURGERY

## 2022-03-07 PROCEDURE — 37000008 HC ANESTHESIA 1ST 15 MINUTES: Performed by: ORTHOPAEDIC SURGERY

## 2022-03-07 PROCEDURE — 26952 PR AMPUTATION FINGER/THUMB+FLAPS: ICD-10-PCS | Mod: F2,,, | Performed by: ORTHOPAEDIC SURGERY

## 2022-03-07 PROCEDURE — 25000003 PHARM REV CODE 250: Performed by: NURSE ANESTHETIST, CERTIFIED REGISTERED

## 2022-03-07 PROCEDURE — 87070 CULTURE OTHR SPECIMN AEROBIC: CPT | Performed by: ORTHOPAEDIC SURGERY

## 2022-03-07 PROCEDURE — 88311 DECALCIFY TISSUE: CPT | Mod: 26,ICN,, | Performed by: PATHOLOGY

## 2022-03-07 PROCEDURE — 88302 TISSUE EXAM BY PATHOLOGIST: CPT | Mod: 26,ICN,, | Performed by: PATHOLOGY

## 2022-03-07 PROCEDURE — 88302 TISSUE EXAM BY PATHOLOGIST: CPT | Performed by: PATHOLOGY

## 2022-03-07 PROCEDURE — 71000033 HC RECOVERY, INTIAL HOUR: Performed by: ORTHOPAEDIC SURGERY

## 2022-03-07 PROCEDURE — 27201423 OPTIME MED/SURG SUP & DEVICES STERILE SUPPLY: Performed by: ORTHOPAEDIC SURGERY

## 2022-03-07 PROCEDURE — D9220A PRA ANESTHESIA: Mod: ANES,,, | Performed by: ANESTHESIOLOGY

## 2022-03-07 PROCEDURE — 88311 DECALCIFY TISSUE: CPT | Performed by: PATHOLOGY

## 2022-03-07 PROCEDURE — 87075 CULTR BACTERIA EXCEPT BLOOD: CPT | Performed by: ORTHOPAEDIC SURGERY

## 2022-03-07 PROCEDURE — 87205 SMEAR GRAM STAIN: CPT | Performed by: ORTHOPAEDIC SURGERY

## 2022-03-07 PROCEDURE — 94761 N-INVAS EAR/PLS OXIMETRY MLT: CPT

## 2022-03-07 PROCEDURE — 87116 MYCOBACTERIA CULTURE: CPT | Performed by: ORTHOPAEDIC SURGERY

## 2022-03-07 PROCEDURE — 26952 AMPUTATION OF FINGER/THUMB: CPT | Mod: F2,,, | Performed by: ORTHOPAEDIC SURGERY

## 2022-03-07 PROCEDURE — 71000015 HC POSTOP RECOV 1ST HR: Performed by: ORTHOPAEDIC SURGERY

## 2022-03-07 PROCEDURE — D9220A PRA ANESTHESIA: Mod: CRNA,,, | Performed by: NURSE ANESTHETIST, CERTIFIED REGISTERED

## 2022-03-07 PROCEDURE — D9220A PRA ANESTHESIA: ICD-10-PCS | Mod: CRNA,,, | Performed by: NURSE ANESTHETIST, CERTIFIED REGISTERED

## 2022-03-07 PROCEDURE — 63600175 PHARM REV CODE 636 W HCPCS: Performed by: STUDENT IN AN ORGANIZED HEALTH CARE EDUCATION/TRAINING PROGRAM

## 2022-03-07 PROCEDURE — 99900035 HC TECH TIME PER 15 MIN (STAT)

## 2022-03-07 PROCEDURE — 37000009 HC ANESTHESIA EA ADD 15 MINS: Performed by: ORTHOPAEDIC SURGERY

## 2022-03-07 PROCEDURE — 88311 PR  DECALCIFY TISSUE: ICD-10-PCS | Mod: 26,ICN,, | Performed by: PATHOLOGY

## 2022-03-07 PROCEDURE — 36000707: Performed by: ORTHOPAEDIC SURGERY

## 2022-03-07 PROCEDURE — 63600175 PHARM REV CODE 636 W HCPCS: Performed by: NURSE ANESTHETIST, CERTIFIED REGISTERED

## 2022-03-07 PROCEDURE — 87206 SMEAR FLUORESCENT/ACID STAI: CPT | Performed by: ORTHOPAEDIC SURGERY

## 2022-03-07 PROCEDURE — 88302 PR  SURG PATH,LEVEL II: ICD-10-PCS | Mod: 26,ICN,, | Performed by: PATHOLOGY

## 2022-03-07 PROCEDURE — 25000003 PHARM REV CODE 250: Performed by: ORTHOPAEDIC SURGERY

## 2022-03-07 PROCEDURE — D9220A PRA ANESTHESIA: ICD-10-PCS | Mod: ANES,,, | Performed by: ANESTHESIOLOGY

## 2022-03-07 RX ORDER — MIDAZOLAM HYDROCHLORIDE 1 MG/ML
INJECTION, SOLUTION INTRAMUSCULAR; INTRAVENOUS
Status: DISCONTINUED | OUTPATIENT
Start: 2022-03-07 | End: 2022-03-07

## 2022-03-07 RX ORDER — PROPOFOL 10 MG/ML
VIAL (ML) INTRAVENOUS CONTINUOUS PRN
Status: DISCONTINUED | OUTPATIENT
Start: 2022-03-07 | End: 2022-03-07

## 2022-03-07 RX ORDER — PROPOFOL 10 MG/ML
VIAL (ML) INTRAVENOUS
Status: DISCONTINUED | OUTPATIENT
Start: 2022-03-07 | End: 2022-03-07

## 2022-03-07 RX ORDER — LIDOCAINE HYDROCHLORIDE 10 MG/ML
INJECTION, SOLUTION EPIDURAL; INFILTRATION; INTRACAUDAL; PERINEURAL
Status: DISCONTINUED | OUTPATIENT
Start: 2022-03-07 | End: 2022-03-07 | Stop reason: HOSPADM

## 2022-03-07 RX ORDER — ONDANSETRON 2 MG/ML
INJECTION INTRAMUSCULAR; INTRAVENOUS
Status: DISCONTINUED | OUTPATIENT
Start: 2022-03-07 | End: 2022-03-07

## 2022-03-07 RX ORDER — CEFAZOLIN SODIUM/WATER 2 G/20 ML
2 SYRINGE (ML) INTRAVENOUS ONCE
Status: COMPLETED | OUTPATIENT
Start: 2022-03-07 | End: 2022-03-07

## 2022-03-07 RX ORDER — BACITRACIN ZINC 500 UNIT/G
OINTMENT (GRAM) TOPICAL
Status: DISCONTINUED | OUTPATIENT
Start: 2022-03-07 | End: 2022-03-07 | Stop reason: HOSPADM

## 2022-03-07 RX ORDER — LIDOCAINE HYDROCHLORIDE 20 MG/ML
INJECTION INTRAVENOUS
Status: DISCONTINUED | OUTPATIENT
Start: 2022-03-07 | End: 2022-03-07

## 2022-03-07 RX ORDER — BUPIVACAINE HYDROCHLORIDE 2.5 MG/ML
INJECTION, SOLUTION EPIDURAL; INFILTRATION; INTRACAUDAL
Status: DISCONTINUED | OUTPATIENT
Start: 2022-03-07 | End: 2022-03-07 | Stop reason: HOSPADM

## 2022-03-07 RX ADMIN — Medication 2 G: at 03:03

## 2022-03-07 RX ADMIN — MIDAZOLAM HYDROCHLORIDE 2 MG: 1 INJECTION, SOLUTION INTRAMUSCULAR; INTRAVENOUS at 03:03

## 2022-03-07 RX ADMIN — PROPOFOL 50 MG: 10 INJECTION, EMULSION INTRAVENOUS at 03:03

## 2022-03-07 RX ADMIN — SODIUM CHLORIDE: 9 INJECTION, SOLUTION INTRAVENOUS at 02:03

## 2022-03-07 RX ADMIN — ONDANSETRON 4 MG: 2 INJECTION, SOLUTION INTRAMUSCULAR; INTRAVENOUS at 03:03

## 2022-03-07 RX ADMIN — Medication 50 MCG/KG/MIN: at 03:03

## 2022-03-07 RX ADMIN — LIDOCAINE HYDROCHLORIDE 75 MG: 20 INJECTION, SOLUTION INTRAVENOUS at 03:03

## 2022-03-07 NOTE — TRANSFER OF CARE
"Anesthesia Transfer of Care Note    Patient: Holden Martinez    Procedure(s) Performed: Procedure(s) (LRB):  AMPUTATION, FINGER, partial left long (Left)  IRRIGATION AND DEBRIDEMENT LEFT LONG FINGER (Left)    Patient location: PACU    Anesthesia Type: general    Transport from OR: Transported from OR on room air with adequate spontaneous ventilation    Post pain: adequate analgesia    Post assessment: no apparent anesthetic complications and tolerated procedure well    Post vital signs: stable    Level of consciousness: awake and alert    Nausea/Vomiting: no nausea/vomiting    Complications: none    Transfer of care protocol was followed      Last vitals:   Visit Vitals  BP (!) 122/57   Pulse 78   Temp 36.5 °C (97.7 °F) (Oral)   Resp 16   Ht 5' 8" (1.727 m)   Wt 124.7 kg (275 lb)   SpO2 100%   BMI 41.81 kg/m²     "

## 2022-03-07 NOTE — DISCHARGE INSTRUCTIONS
AFTER HAND SURGERY    DOS:  Keep affected wrist elevated above the level of your heart  Check circulation frequently in fingers by pressing on the nail bed. Nail bed should turn white and then pink when released.  Exercise fingers to promote circulation.  Advance diet as tolerated  Resume home medications today.      DONT:  No driving for 24 hours or while taking narcotic pain medication  DO NOT TAKE ADDITIONAL TYLENOL/ACETAMINOPHEN WHILE TAKING NARCOTIC PAIN MEDICATION THAT CONTAINS TYLENOL/ACETAMINOPHEN.    CALL PHYSICIAN FOR:  Obvious bleeding  Excessive swelling  Drainage (pus) from the wound  Pain unrelieved by pain medication.  If dressing gets wet

## 2022-03-07 NOTE — DISCHARGE SUMMARY
[facility] - Surgery (Hospital)  Discharge Note  Short Stay    Procedure(s) (LRB):  AMPUTATION, FINGER, partial left long (Left)    OUTCOME: Patient tolerated treatment/procedure well without complication and is now ready for discharge.    DISPOSITION: Home or Self Care    FINAL DIAGNOSIS:  Same as pre op     FOLLOWUP: In clinic    DISCHARGE INSTRUCTIONS:  Keep dressing clean dry and intact. Take pain medications as prescribed     TIME SPENT ON DISCHARGE: 10 minutes

## 2022-03-07 NOTE — BRIEF OP NOTE
[place] - Surgery ([facility])  Brief Operative Note    Surgery Date: 3/7/2022     Surgeon(s) and Role:     * Sarah Hernandez MD - Primary    Assisting Surgeon:   Brian Rivera MD - Fellow    Pre-op Diagnosis:  Fingertip amputation, initial encounter [S68.119A]  Pain of left middle finger [M79.645]    Post-op Diagnosis:  Post-Op Diagnosis Codes:     * Fingertip amputation, initial encounter [S68.119A]     * Pain of left middle finger [M79.645]    Procedure(s) (LRB):  AMPUTATION, FINGER, partial left long (Left)    Anesthesia: Local MAC    Operative Findings: See op report    Estimated Blood Loss: Minimal         Specimens:   Specimen (24h ago, onward)            None            Discharge Note    OUTCOME: Patient tolerated treatment/procedure well without complication and is now ready for discharge.    DISPOSITION: Home or Self Care    FINAL DIAGNOSIS:  Same as pre op     FOLLOWUP: In clinic

## 2022-03-07 NOTE — PLAN OF CARE
VSS. Patient able to tolerate oral liquids. Patient denies c/o pain. Patient received home medication per bedside delivery. Dressing intact. No distress noted. Patient states he is ready for discharge. Discharge instructions reviewed with patient, verbalized understanding. IV discontinued with catheter tip intact. Staff at bedside to help patient dress. Patient placed in own wheelchair, transportation called for transport. Patient wheeled to lobby via staff.

## 2022-03-07 NOTE — ANESTHESIA PREPROCEDURE EVALUATION
03/07/2022  Holden Martinez is a 71 y.o., male.    Pre-operative evaluation for Procedure(s) (LRB):  AMPUTATION, FINGER, partial left long (Left)    Holden Martinez is a 71 y.o. male     Patient Active Problem List   Diagnosis    Laceration of left middle finger without foreign body    HTN (hypertension)    Alcohol use    Elevated transaminase level    Macrocytic anemia    Thrombocytopenia    Fingertip amputation, initial encounter       Review of patient's allergies indicates:   Allergen Reactions    Grass pollen-june grass standard        No current facility-administered medications on file prior to encounter.     Current Outpatient Medications on File Prior to Encounter   Medication Sig Dispense Refill    acetaminophen (TYLENOL) 325 MG tablet Take 2 tablets (650 mg total) by mouth every 6 (six) hours as needed for Pain.  0    amLODIPine (NORVASC) 5 MG tablet Take 5 mg by mouth every morning.      benazepriL (LOTENSIN) 40 MG tablet Take 40 mg by mouth every morning.      LIDOcaine (LIDODERM) 5 % Place 2 patches onto the skin every evening. To back. Remove & Discard patch within 12 hours or as directed by MD  0    multivit-min/FA/lycopen/lutein (MEN 50 PLUS MULTIVITAMIN ORAL) Take 1 tablet by mouth once daily.      sulfamethoxazole-trimethoprim 800-160mg (BACTRIM DS) 800-160 mg Tab Take 1 tablet by mouth 2 (two) times daily. End date: 3/8/2022. for 12 days 28 tablet 0    thiamine 100 MG tablet Take 1 tablet (100 mg total) by mouth once daily.      vitamin B complex (VITAMINS B COMPLEX ORAL) Take 1 tablet by mouth once daily.      vitamin D (VITAMIN D3) 1000 units Tab Take 1,000 Units by mouth once daily.      dext 70/polycarbophil/peg/NaCl (ARTIFICIAL TEAR SOLUTION OPHT) Place 1 drop into both eyes 4 (four) times daily as needed (dry eye).      docusate sodium (STOOL SOFTENER  ORAL) Take 1 capsule by mouth daily as needed (constipation).      folic acid (FOLVITE) 1 MG tablet Take 1 tablet (1 mg total) by mouth once daily.  0    loratadine (CLARITIN) 10 mg tablet Take 10 mg by mouth once daily.      [DISCONTINUED] aspirin (ECOTRIN) 81 MG EC tablet Take 81 mg by mouth once daily.         History reviewed. No pertinent surgical history.      CBC:  Lab Results   Component Value Date    WBC 5.54 02/23/2022    RBC 2.86 (L) 02/23/2022    HGB 10.3 (L) 02/23/2022    HCT 30.8 (L) 02/23/2022    PLT 47 (L) 02/23/2022     (H) 02/23/2022    MCH 36.0 (H) 02/23/2022    MCHC 33.4 02/23/2022       CMP:   Lab Results   Component Value Date     02/24/2022    K 3.4 (L) 02/24/2022     02/24/2022    CO2 24 02/24/2022    BUN 12 02/24/2022    CREATININE 0.8 02/24/2022    GLU 88 02/24/2022    MG 1.8 02/22/2022    PHOS 3.2 02/22/2022    CALCIUM 8.1 (L) 02/24/2022    ALBUMIN 2.6 (L) 02/24/2022    PROT 6.0 02/24/2022    ALKPHOS 72 02/24/2022    ALT 23 02/24/2022    AST 61 (H) 02/24/2022    BILITOT 2.9 (H) 02/24/2022       INR:  Lab Results   Component Value Date    INR 1.5 (H) 02/24/2022         Diagnostic Studies:      EKG:   Results for orders placed or performed during the hospital encounter of 02/21/22   EKG 12-lead    Collection Time: 02/21/22 10:44 AM    Narrative    Test Reason : W10.8XXA,    Vent. Rate : 091 BPM     Atrial Rate : 091 BPM     P-R Int : 168 ms          QRS Dur : 086 ms      QT Int : 382 ms       P-R-T Axes : 053 -03 057 degrees     QTc Int : 469 ms    Normal sinus rhythm  Normal ECG  No previous ECGs available  Confirmed by Mike Coreas MD ([id number]) on 2/21/2022 3:19:28 PM    Referred By: KISHAN   SELF           Confirmed By:Mike Coreas MD        2D Echo:  No results found for this or any previous visit.    Stress Test:   No results found for this or any previous visit.      Pre-op Vitals [03/07/22 1247]   BP Pulse Resp Temp SpO2   (!) 122/57 77 18 36.5 °C (97.7 °F) 100 %     "  Height Weight BMI (Calculated)     5' 8" 275 lb 41.8       Pre-op Assessment    I have reviewed the Patient Summary Reports.     I have reviewed the Nursing Notes. I have reviewed the NPO Status.      Review of Systems  Anesthesia Hx:  No problems with previous Anesthesia    Social:  Alcohol Use    Cardiovascular:   Exercise tolerance: poor Hypertension ECG has been reviewed.    Hepatic/GI:   Liver Disease, INR 1.5, platelets 40, t bili 2.9, AST 60       Physical Exam  General: Large beard  Airway:  Mouth Opening: Normal    Dental:  Edentulous  Edentulous on upper teeth  Chest/Lungs:  Clear to auscultation    Heart:  Rate: Normal        Anesthesia Plan  Type of Anesthesia, risks & benefits discussed:    Anesthesia Type: Gen Natural Airway  Intra-op Monitoring Plan: Standard ASA Monitors  Post Op Pain Control Plan: multimodal analgesia, peripheral nerve block and IV/PO Opioids PRN  Induction:  IV  Informed Consent: Informed consent signed with the Patient and all parties understand the risks and agree with anesthesia plan.  All questions answered.   ASA Score: 3  Anesthesia Plan Notes: BMI > 40    Ready For Surgery From Anesthesia Perspective.     .      "

## 2022-03-07 NOTE — PLAN OF CARE
Pre-op complete. Waiting on anesthesia consent. No family present. Will call for patient's transport in recovery.

## 2022-03-08 LAB
GRAM STN SPEC: NORMAL
GRAM STN SPEC: NORMAL

## 2022-03-09 NOTE — OP NOTE
[facility] Surgery ([facility])  Surgery Department  Operative Note    SUMMARY     Date of Procedure: 3/7/2022     Procedure: Procedure(s) (LRB):  AMPUTATION, FINGER, partial left long (Left)  IRRIGATION AND DEBRIDEMENT LEFT LONG FINGER (Left)     Surgeon(s) and Role:     * Sarah Hernandez MD - Primary    Assisting Surgeon: None    Pre-Operative Diagnosis: Fingertip amputation, initial encounter [S68.119A]  Pain of left middle finger [M79.645]    Post-Operative Diagnosis: Post-Op Diagnosis Codes:     * Fingertip amputation, initial encounter [S68.119A]     * Pain of left middle finger [M79.645]    Anesthesia: Local MAC    Technical Procedures Used: surgery    Description of the Findings of the Procedure:  Indication for procedure Mr. Martinez is a 71-year-old male who sustained a injury to his left long finger he was treated in the emergency room however he had gross this that developed as he was followed in the clinic after much discussion we elected for surgical intervention specifically a revision amputation risks benefits were explained the patient in clinic consents were signed in clinic    Procedure in detail the correct site was marked with the patient's participation in the holding area patient was brought to the operating placed in supine position underwent MAC anesthesia well-padded nonsterile tourniquet was placed on the left forearm under sterile conditions an injection of lidocaine 1% was injected as a block the arm was prepped draped normal sterile fashion original sutures were removed the arm was elevated the nail bed really did have necrosis therefore this was cut out rongeur was used to remove the exposed bone a rasp was used to smooth out the exposed bone skin flap was then made where we could elevate the dorsal aspect of the skin at the finger down more volarly sutured into position this was not tight and actually had patient had great passive mobility of the joint sterile dressing was then  applied patient was transferred to recovery area in stable condition    Postop plans patient keep the dressing clean dry intact will watch the patient week 1 sutures out weak to her 3 therapy to be initiated will discontinue to watch the wound  Of note good bleeding soft tissue was noted with a tourniquet had been deflated    Significant Surgical Tasks Conducted by the Assistant(s), if Applicable: retraction    Complications: No    Estimated Blood Loss (EBL): * No values recorded between 3/7/2022  3:37 PM and 3/7/2022  4:17 PM *           Implants: * No implants in log *    Specimens:   Specimen (24h ago, onward)            None                  Condition: Good    Disposition: PACU - hemodynamically stable.    Attestation: I performed the procedure.    Discharge Note    SUMMARY     Admit Date: 3/7/2022    Discharge Date and Time: 3/7/2022  5:05 PM    Hospital Course (synopsis of major diagnoses, care, treatment, and services provided during the course of the hospital stay): surgery     Final Diagnosis: Post-Op Diagnosis Codes:     * Fingertip amputation, initial encounter [S68.119A]     * Pain of left middle finger [M79.645]    Disposition: Home or Self Care    Follow Up/Patient Instructions:     Medications:  Reconciled Home Medications:      Medication List      CONTINUE taking these medications    acetaminophen 325 MG tablet  Commonly known as: TYLENOL  Take 2 tablets (650 mg total) by mouth every 6 (six) hours as needed for Pain.     amLODIPine 5 MG tablet  Commonly known as: NORVASC  Take 5 mg by mouth every morning.     ARTIFICIAL TEAR SOLUTION OPHT  Place 1 drop into both eyes 4 (four) times daily as needed (dry eye).     benazepriL 40 MG tablet  Commonly known as: LOTENSIN  Take 40 mg by mouth every morning.     folic acid 1 MG tablet  Commonly known as: FOLVITE  Take 1 tablet (1 mg total) by mouth once daily.     ibuprofen 600 MG tablet  Commonly known as: ADVIL,MOTRIN  Take 1 tablet (600 mg total) by mouth  3 (three) times daily as needed for Pain. (mild-moderate)     LIDOcaine 5 %  Commonly known as: LIDODERM  Place 2 patches onto the skin every evening. To back. Remove & Discard patch within 12 hours or as directed by MD     loratadine 10 mg tablet  Commonly known as: CLARITIN  Take 10 mg by mouth once daily.     MEN 50 PLUS MULTIVITAMIN ORAL  Take 1 tablet by mouth once daily.     oxyCODONE-acetaminophen 5-325 mg per tablet  Commonly known as: PERCOCET  Take 1 tablet by mouth every 6 (six) hours as needed for Pain. (moderate-severe).     STOOL SOFTENER ORAL  Take 1 capsule by mouth daily as needed (constipation).     thiamine 100 MG tablet  Take 1 tablet (100 mg total) by mouth once daily.     vitamin D 1000 units Tab  Commonly known as: VITAMIN D3  Take 1,000 Units by mouth once daily.     VITAMINS B COMPLEX ORAL  Take 1 tablet by mouth once daily.        STOP taking these medications    aspirin 81 MG EC tablet  Commonly known as: ECOTRIN        ASK your doctor about these medications    sulfamethoxazole-trimethoprim 800-160mg 800-160 mg Tab  Commonly known as: BACTRIM DS  Take 1 tablet by mouth 2 (two) times daily. End date: 3/8/2022. for 12 days  Ask about: Should I take this medication?          Discharge Procedure Orders   Diet Adult Regular     Notify your health care provider if you experience any of the following:  increased confusion or weakness     Notify your health care provider if you experience any of the following:  persistent dizziness, light-headedness, or visual disturbances     Notify your health care provider if you experience any of the following:  worsening rash     Notify your health care provider if you experience any of the following:  severe persistent headache     Notify your health care provider if you experience any of the following:  difficulty breathing or increased cough     Notify your health care provider if you experience any of the following:  redness, tenderness, or signs of  infection (pain, swelling, redness, odor or green/yellow discharge around incision site)     Notify your health care provider if you experience any of the following:  severe uncontrolled pain     Notify your health care provider if you experience any of the following:  persistent nausea and vomiting or diarrhea     Notify your health care provider if you experience any of the following:  temperature >100.4     Leave dressing on - Keep it clean, dry, and intact until clinic visit     Weight bearing restrictions (specify):   Order Comments: Non weight bearing on operated hand      Follow-up Information     Sarah Hernandez MD Follow up in 2 week(s).    Specialties: Hand Surgery, Orthopedic Surgery  Why: For suture removal, For wound re-check  Contact information:  [address]  [facility] [address]  [facility]  [facility] [id number] [phone]

## 2022-03-10 ENCOUNTER — TELEPHONE (OUTPATIENT)
Dept: ORTHOPEDICS | Facility: CLINIC | Age: 72
End: 2022-03-10
Payer: MEDICARE

## 2022-03-10 NOTE — ANESTHESIA POSTPROCEDURE EVALUATION
Anesthesia Post Evaluation    Patient: Holden Martinez    Procedure(s) Performed: Procedure(s) (LRB):  AMPUTATION, FINGER, partial left long (Left)  IRRIGATION AND DEBRIDEMENT LEFT LONG FINGER (Left)    Final Anesthesia Type: general      Patient location during evaluation: PACU  Patient participation: Yes- Able to Participate  Level of consciousness: awake and alert  Post-procedure vital signs: reviewed and stable  Pain management: adequate  Airway patency: patent    PONV status at discharge: No PONV  Anesthetic complications: no      Cardiovascular status: blood pressure returned to baseline  Respiratory status: unassisted  Hydration status: euvolemic  Follow-up not needed.          Vitals Value Taken Time   /80 03/07/22 1630   Temp 36.7 °C (98 °F) 03/07/22 1630   Pulse 98 03/07/22 1630   Resp 18 03/07/22 1630   SpO2 97 % 03/07/22 1630         Event Time   Out of Recovery 16:40:00         Pain/Stanley Score: No data recorded

## 2022-03-10 NOTE — TELEPHONE ENCOUNTER
----- Message from Beni Salcido sent at 3/10/2022 10:07 AM CST -----  Name of Who is Calling: ZAMZAM MACK          What is the request in detail:The patient is calling to speak to the nurse in regards to his upcoming appointment. Please advise          Can the clinic reply by [facility]: No         [facility] [phone]

## 2022-03-10 NOTE — TELEPHONE ENCOUNTER
Spoke with pt informing him that he will have to come [facility] for is post op appt on 3/22/22.  Pt verbalized understanding.

## 2022-03-11 LAB — BACTERIA SPEC AEROBE CULT: NO GROWTH

## 2022-03-15 LAB — BACTERIA SPEC ANAEROBE CULT: NORMAL

## 2022-03-16 LAB
FINAL PATHOLOGIC DIAGNOSIS: NORMAL
Lab: NORMAL

## 2022-03-22 ENCOUNTER — OFFICE VISIT (OUTPATIENT)
Dept: ORTHOPEDICS | Facility: CLINIC | Age: 72
End: 2022-03-22
Payer: MEDICARE

## 2022-03-22 VITALS — BODY MASS INDEX: 41.68 KG/M2 | HEIGHT: 68 IN | WEIGHT: 275 LBS

## 2022-03-22 DIAGNOSIS — S68.119A FINGERTIP AMPUTATION, INITIAL ENCOUNTER: Primary | ICD-10-CM

## 2022-03-22 DIAGNOSIS — Z47.89 ORTHOPEDIC AFTERCARE: ICD-10-CM

## 2022-03-22 PROCEDURE — 1159F PR MEDICATION LIST DOCUMENTED IN MEDICAL RECORD: ICD-10-PCS | Mod: CPTII,S$GLB,, | Performed by: PHYSICIAN ASSISTANT

## 2022-03-22 PROCEDURE — 4010F PR ACE/ARB THEARPY RXD/TAKEN: ICD-10-PCS | Mod: CPTII,S$GLB,, | Performed by: PHYSICIAN ASSISTANT

## 2022-03-22 PROCEDURE — 99999 PR PBB SHADOW E&M-EST. PATIENT-LVL III: ICD-10-PCS | Mod: PBBFAC,,, | Performed by: PHYSICIAN ASSISTANT

## 2022-03-22 PROCEDURE — 3008F BODY MASS INDEX DOCD: CPT | Mod: CPTII,S$GLB,, | Performed by: PHYSICIAN ASSISTANT

## 2022-03-22 PROCEDURE — 1159F MED LIST DOCD IN RCRD: CPT | Mod: CPTII,S$GLB,, | Performed by: PHYSICIAN ASSISTANT

## 2022-03-22 PROCEDURE — 1101F PT FALLS ASSESS-DOCD LE1/YR: CPT | Mod: CPTII,S$GLB,, | Performed by: PHYSICIAN ASSISTANT

## 2022-03-22 PROCEDURE — 99999 PR PBB SHADOW E&M-EST. PATIENT-LVL III: CPT | Mod: PBBFAC,,, | Performed by: PHYSICIAN ASSISTANT

## 2022-03-22 PROCEDURE — 1126F AMNT PAIN NOTED NONE PRSNT: CPT | Mod: CPTII,S$GLB,, | Performed by: PHYSICIAN ASSISTANT

## 2022-03-22 PROCEDURE — 99024 PR POST-OP FOLLOW-UP VISIT: ICD-10-PCS | Mod: S$GLB,,, | Performed by: PHYSICIAN ASSISTANT

## 2022-03-22 PROCEDURE — 1160F PR REVIEW ALL MEDS BY PRESCRIBER/CLIN PHARMACIST DOCUMENTED: ICD-10-PCS | Mod: CPTII,S$GLB,, | Performed by: PHYSICIAN ASSISTANT

## 2022-03-22 PROCEDURE — 1126F PR PAIN SEVERITY QUANTIFIED, NO PAIN PRESENT: ICD-10-PCS | Mod: CPTII,S$GLB,, | Performed by: PHYSICIAN ASSISTANT

## 2022-03-22 PROCEDURE — 99024 POSTOP FOLLOW-UP VISIT: CPT | Mod: S$GLB,,, | Performed by: PHYSICIAN ASSISTANT

## 2022-03-22 PROCEDURE — 1160F RVW MEDS BY RX/DR IN RCRD: CPT | Mod: CPTII,S$GLB,, | Performed by: PHYSICIAN ASSISTANT

## 2022-03-22 PROCEDURE — 3288F FALL RISK ASSESSMENT DOCD: CPT | Mod: CPTII,S$GLB,, | Performed by: PHYSICIAN ASSISTANT

## 2022-03-22 PROCEDURE — 1101F PR PT FALLS ASSESS DOC 0-1 FALLS W/OUT INJ PAST YR: ICD-10-PCS | Mod: CPTII,S$GLB,, | Performed by: PHYSICIAN ASSISTANT

## 2022-03-22 PROCEDURE — 3008F PR BODY MASS INDEX (BMI) DOCUMENTED: ICD-10-PCS | Mod: CPTII,S$GLB,, | Performed by: PHYSICIAN ASSISTANT

## 2022-03-22 PROCEDURE — 3288F PR FALLS RISK ASSESSMENT DOCUMENTED: ICD-10-PCS | Mod: CPTII,S$GLB,, | Performed by: PHYSICIAN ASSISTANT

## 2022-03-22 PROCEDURE — 4010F ACE/ARB THERAPY RXD/TAKEN: CPT | Mod: CPTII,S$GLB,, | Performed by: PHYSICIAN ASSISTANT

## 2022-03-22 NOTE — PROGRESS NOTES
"Mr. Martinez is here today for a post-operative visit.  He is 15 days status post left long finger irrigation and debridement with partial amputation by Dr. Hernandez on 3/7/2022. He reports that he is doing well.  Pain is mild, no current pain.  He is not taking pain medication.  He denies fever, chills, and sweats since the time of the surgery.     Physical exam:    Vitals:    03/22/22 1306   Weight: 124.7 kg (275 lb)   Height: 5' 8" (1.727 m)   PainSc: 0-No pain     Vital signs are stable, patient is afebrile.  Patient is well dressed and well groomed, no acute distress.  Alert and oriented to person, place, and time.  Post op dressing taken down.  Incision is clean, dry, and intact.  There is no erythema or exudate.  There is no sign of any infection. He is NVI. Sutures removed with slight difficulty due to dried blood and patient's finger shaking. Decreased finger ROM left long. Fair finger ROM of the other fingers left hand.     Assessment: 15 days status post left long finger irrigation and debridement with partial amputation    Plan:  Holden was seen today for post-op evaluation.    Diagnoses and all orders for this visit:    Fingertip amputation, initial encounter  -     Ambulatory referral/consult to Physical/Occupational Therapy; Future    Orthopedic aftercare        - PO instruction reviewed and provided to patient  - Healing well, discussed covering until suture sites heal in  - Orders for OT, discussed importance of outpatient OT for fingertip shaping, ROM, and desensitization  - Call with questions or concerns  - Follow up in 2-3 weeks        "

## 2022-04-12 ENCOUNTER — DOCUMENTATION ONLY (OUTPATIENT)
Dept: ORTHOPEDICS | Facility: CLINIC | Age: 72
End: 2022-04-12
Payer: MEDICARE

## 2022-04-12 LAB — FUNGUS SPEC CULT: NORMAL

## 2022-04-26 LAB
ACID FAST MOD KINY STN SPEC: NORMAL
MYCOBACTERIUM SPEC QL CULT: NORMAL

## 2022-07-10 ENCOUNTER — HOSPITAL ENCOUNTER (EMERGENCY)
Facility: HOSPITAL | Age: 72
Discharge: HOME | End: 2022-07-10
Attending: EMERGENCY MEDICINE
Payer: MEDICARE

## 2022-07-10 VITALS
BODY MASS INDEX: 43.95 KG/M2 | TEMPERATURE: 99 F | WEIGHT: 290 LBS | RESPIRATION RATE: 18 BRPM | DIASTOLIC BLOOD PRESSURE: 75 MMHG | HEIGHT: 68 IN | HEART RATE: 89 BPM | OXYGEN SATURATION: 95 % | SYSTOLIC BLOOD PRESSURE: 121 MMHG

## 2022-07-10 DIAGNOSIS — F10.129 ALCOHOL ABUSE WITH INTOXICATION: ICD-10-CM

## 2022-07-10 DIAGNOSIS — S32.040A COMPRESSION FRACTURE OF L4 LUMBAR VERTEBRA, CLOSED, INITIAL ENCOUNTER: Primary | ICD-10-CM

## 2022-07-10 DIAGNOSIS — D69.6 THROMBOCYTOPENIA: ICD-10-CM

## 2022-07-10 LAB
ALBUMIN SERPL BCP-MCNC: 3 G/DL (ref 3.5–5.2)
ALP SERPL-CCNC: 89 U/L (ref 55–135)
ALT SERPL W/O P-5'-P-CCNC: 29 U/L (ref 10–44)
ANION GAP SERPL CALC-SCNC: 19 MMOL/L (ref 8–16)
AST SERPL-CCNC: 78 U/L (ref 10–40)
BASOPHILS # BLD AUTO: 0.12 K/UL (ref 0–0.2)
BASOPHILS NFR BLD: 2.6 % (ref 0–1.9)
BILIRUB SERPL-MCNC: 2.9 MG/DL (ref 0.1–1)
BUN SERPL-MCNC: 7 MG/DL (ref 8–23)
CALCIUM SERPL-MCNC: 8.5 MG/DL (ref 8.7–10.5)
CHLORIDE SERPL-SCNC: 108 MMOL/L (ref 95–110)
CO2 SERPL-SCNC: 17 MMOL/L (ref 23–29)
CREAT SERPL-MCNC: 0.7 MG/DL (ref 0.5–1.4)
DIFFERENTIAL METHOD: ABNORMAL
EOSINOPHIL # BLD AUTO: 0 K/UL (ref 0–0.5)
EOSINOPHIL NFR BLD: 0.9 % (ref 0–8)
ERYTHROCYTE [DISTWIDTH] IN BLOOD BY AUTOMATED COUNT: 15 % (ref 11.5–14.5)
EST. GFR  (AFRICAN AMERICAN): >60 ML/MIN/1.73 M^2
EST. GFR  (NON AFRICAN AMERICAN): >60 ML/MIN/1.73 M^2
ETHANOL SERPL-MCNC: 112 MG/DL (ref 0–10)
GLUCOSE SERPL-MCNC: 119 MG/DL (ref 70–110)
HCT VFR BLD AUTO: 41.4 % (ref 40–54)
HGB BLD-MCNC: 14 G/DL (ref 14–18)
IMM GRANULOCYTES # BLD AUTO: 0.04 K/UL (ref 0–0.04)
IMM GRANULOCYTES NFR BLD AUTO: 0.9 % (ref 0–0.5)
LYMPHOCYTES # BLD AUTO: 0.5 K/UL (ref 1–4.8)
LYMPHOCYTES NFR BLD: 10.9 % (ref 18–48)
MCH RBC QN AUTO: 33.3 PG (ref 27–31)
MCHC RBC AUTO-ENTMCNC: 33.8 G/DL (ref 32–36)
MCV RBC AUTO: 99 FL (ref 82–98)
MONOCYTES # BLD AUTO: 0.6 K/UL (ref 0.3–1)
MONOCYTES NFR BLD: 12.6 % (ref 4–15)
NEUTROPHILS # BLD AUTO: 3.3 K/UL (ref 1.8–7.7)
NEUTROPHILS NFR BLD: 72.1 % (ref 38–73)
NRBC BLD-RTO: 0 /100 WBC
PLATELET # BLD AUTO: 61 K/UL (ref 150–450)
PMV BLD AUTO: 9.8 FL (ref 9.2–12.9)
POTASSIUM SERPL-SCNC: 3.6 MMOL/L (ref 3.5–5.1)
PROT SERPL-MCNC: 7.7 G/DL (ref 6–8.4)
RBC # BLD AUTO: 4.2 M/UL (ref 4.6–6.2)
SODIUM SERPL-SCNC: 144 MMOL/L (ref 136–145)
WBC # BLD AUTO: 4.6 K/UL (ref 3.9–12.7)

## 2022-07-10 PROCEDURE — 96374 THER/PROPH/DIAG INJ IV PUSH: CPT

## 2022-07-10 PROCEDURE — 82077 ASSAY SPEC XCP UR&BREATH IA: CPT | Performed by: EMERGENCY MEDICINE

## 2022-07-10 PROCEDURE — 72192 CT PELVIS W/O DYE: CPT | Mod: 26,,, | Performed by: RADIOLOGY

## 2022-07-10 PROCEDURE — 63600175 PHARM REV CODE 636 W HCPCS: Performed by: EMERGENCY MEDICINE

## 2022-07-10 PROCEDURE — 72192 CT PELVIS W/O DYE: CPT | Mod: TC

## 2022-07-10 PROCEDURE — 72131 CT LUMBAR SPINE WITHOUT CONTRAST: ICD-10-PCS | Mod: 26,,, | Performed by: RADIOLOGY

## 2022-07-10 PROCEDURE — 25000003 PHARM REV CODE 250: Performed by: EMERGENCY MEDICINE

## 2022-07-10 PROCEDURE — 99284 EMERGENCY DEPT VISIT MOD MDM: CPT | Mod: 25

## 2022-07-10 PROCEDURE — 80053 COMPREHEN METABOLIC PANEL: CPT | Performed by: EMERGENCY MEDICINE

## 2022-07-10 PROCEDURE — 72131 CT LUMBAR SPINE W/O DYE: CPT | Mod: TC

## 2022-07-10 PROCEDURE — 72131 CT LUMBAR SPINE W/O DYE: CPT | Mod: 26,,, | Performed by: RADIOLOGY

## 2022-07-10 PROCEDURE — 72192 CT PELVIS WITHOUT CONTRAST: ICD-10-PCS | Mod: 26,,, | Performed by: RADIOLOGY

## 2022-07-10 PROCEDURE — 85025 COMPLETE CBC W/AUTO DIFF WBC: CPT | Performed by: EMERGENCY MEDICINE

## 2022-07-10 PROCEDURE — 96376 TX/PRO/DX INJ SAME DRUG ADON: CPT

## 2022-07-10 RX ORDER — ORPHENADRINE CITRATE 30 MG/ML
60 INJECTION INTRAMUSCULAR; INTRAVENOUS
Status: COMPLETED | OUTPATIENT
Start: 2022-07-10 | End: 2022-07-10

## 2022-07-10 RX ORDER — HYDROCODONE BITARTRATE AND ACETAMINOPHEN 10; 325 MG/1; MG/1
1 TABLET ORAL
Status: COMPLETED | OUTPATIENT
Start: 2022-07-10 | End: 2022-07-10

## 2022-07-10 RX ORDER — HYDROCODONE BITARTRATE AND ACETAMINOPHEN 10; 325 MG/1; MG/1
1 TABLET ORAL EVERY 4 HOURS PRN
Qty: 20 TABLET | Refills: 0 | Status: SHIPPED | OUTPATIENT
Start: 2022-07-10

## 2022-07-10 RX ADMIN — HYDROCODONE BITARTRATE AND ACETAMINOPHEN 1 TABLET: 10; 325 TABLET ORAL at 09:07

## 2022-07-10 RX ADMIN — SODIUM CHLORIDE 500 ML: 0.9 INJECTION, SOLUTION INTRAVENOUS at 10:07

## 2022-07-10 RX ADMIN — ORPHENADRINE CITRATE 60 MG: 30 INJECTION INTRAMUSCULAR; INTRAVENOUS at 09:07

## 2022-07-10 NOTE — ED PROVIDER NOTES
Encounter Date: 7/10/2022       History     Chief Complaint   Patient presents with    Fall     Per EMS patient had a fall around approx 1200am this morning and was down for 9 hours. Patient complaining of lower back pain.     Back Pain     Pt here with LBP after fall during the night while walking to . He says he laid on the floor from about midnight until pta. He admits to drinking last night. He reports LBP and difficulty walking bc of the pain. He has hx of chronic back pain. He denies leg weakness and has no BBI/SA.     The history is provided by the patient.   Back Pain   This is a new problem. The current episode started just prior to arrival. The problem has been unchanged. The pain is associated with falling. The pain is present in the lumbar spine. The quality of the pain is described as stabbing. The pain does not radiate. The pain is at a severity of 9/10. Pertinent negatives include no chest pain, no fever, no numbness, no headaches, no abdominal pain, no bowel incontinence, no perianal numbness, no bladder incontinence, no dysuria, no leg pain, no paresthesias, no paresis and no weakness. He has tried nothing for the symptoms. Risk factors include obesity.     Review of patient's allergies indicates:   Allergen Reactions    Grass pollen-kellie grass standard      Past Medical History:   Diagnosis Date    Herniated lumbar intervertebral disc     Hypertension      Past Surgical History:   Procedure Laterality Date    FINGER AMPUTATION Left 3/7/2022    Procedure: AMPUTATION, FINGER, partial left long;  Surgeon: Sarah Hernandez MD;  Location: [facility];  Service: Orthopedics;  Laterality: Left;     History reviewed. No pertinent family history.  Social History     Tobacco Use    Smoking status: Never Smoker    Smokeless tobacco: Never Used   Substance Use Topics    Alcohol use: Yes     Comment: 4 X's a week    Drug use: Never     Review of Systems   Constitutional: Negative for chills and  fever.   Respiratory: Negative for shortness of breath.    Cardiovascular: Negative for chest pain and palpitations.   Gastrointestinal: Negative for abdominal pain and bowel incontinence.   Genitourinary: Negative for bladder incontinence and dysuria.   Musculoskeletal: Positive for back pain. Negative for neck pain.   Skin: Negative for rash.   Neurological: Negative for seizures, syncope, weakness, numbness, headaches and paresthesias.   All other systems reviewed and are negative.      Physical Exam     Initial Vitals   BP Pulse Resp Temp SpO2   07/10/22 0920 07/10/22 0920 07/10/22 0920 07/10/22 1048 07/10/22 0920   139/66 95 20 98.6 °F (37 °C) 96 %      MAP       --                Physical Exam    Nursing note and vitals reviewed.  Constitutional:   Obese elderly male whom appears intoxicated. Tremulous. HABITUS LIMITS EXAM   HENT:   Head: Normocephalic and atraumatic.   Eyes: EOM are normal. Pupils are equal, round, and reactive to light. No scleral icterus.   Injected sclera   Neck: Neck supple. No JVD present.   Normal range of motion.  Cardiovascular: Normal rate, regular rhythm, normal heart sounds and intact distal pulses.   Pulmonary/Chest: Breath sounds normal. No respiratory distress. He exhibits no tenderness.   Abdominal: Abdomen is soft. Bowel sounds are normal. There is no abdominal tenderness.   Musculoskeletal:         General: Normal range of motion.      Cervical back: Normal range of motion and neck supple.      Comments: Brawny edema BLE with CVI changes     Neurological: He is alert and oriented to person, place, and time. He has normal strength. GCS score is 15. GCS eye subscore is 4. GCS verbal subscore is 5. GCS motor subscore is 6.   Skin: Skin is warm and dry. Capillary refill takes less than 2 seconds. No rash noted. There is erythema.         ED Course   Procedures  Labs Reviewed   CBC W/ AUTO DIFFERENTIAL - Abnormal; Notable for the following components:       Result Value    RBC  4.20 (*)     MCV 99 (*)     MCH 33.3 (*)     RDW 15.0 (*)     Platelets 61 (*)     Immature Granulocytes 0.9 (*)     Lymph # 0.5 (*)     Lymph % 10.9 (*)     Basophil % 2.6 (*)     All other components within normal limits   COMPREHENSIVE METABOLIC PANEL - Abnormal; Notable for the following components:    CO2 17 (*)     Glucose 119 (*)     BUN 7 (*)     Calcium 8.5 (*)     Albumin 3.0 (*)     Total Bilirubin 2.9 (*)     AST 78 (*)     Anion Gap 19 (*)     All other components within normal limits   ALCOHOL,MEDICAL (ETHANOL) - Abnormal; Notable for the following components:    Alcohol, Serum 112 (*)     All other components within normal limits          Imaging Results          CT Pelvis Without Contrast (Final result)  Result time 07/10/22 10:31:49    Final result by Colin Leonard MD (07/10/22 10:31:49)                 Impression:      No acute osseous abnormality.      Electronically signed by: Colin Leonard MD  Date:    07/10/2022  Time:    10:31             Narrative:    EXAMINATION:  CT PELVIS WITHOUT CONTRAST    CLINICAL HISTORY:  Pelvic trauma;    TECHNIQUE:  Axial images through the pelvis were acquired without contrast.  Multiplanar reformatted images were created    COMPARISON:  None    FINDINGS:  There is no fracture or dislocation.  The sacroiliac joints and pubic symphysis are intact.  There is moderate degenerative change of both hips and the symphysis pubis.                               CT Lumbar Spine Without Contrast (Final result)  Result time 07/10/22 10:29:30    Final result by Colin Leonard MD (07/10/22 10:29:30)                 Impression:      Recent appearing mild compression fracture of L4.      Electronically signed by: Colin Leonard MD  Date:    07/10/2022  Time:    10:29             Narrative:    EXAMINATION:  CT LUMBAR SPINE WITHOUT CONTRAST    CLINICAL HISTORY:  Low back pain, trauma;    TECHNIQUE:  Low-dose axial, sagittal and coronal reformations are obtained  through the lumbar spine.  Contrast was not administered.    COMPARISON:  None.    FINDINGS:  There is a mild, recent appearing compression fracture of L4, with 20% loss of height.  There is no malalignment.  Mild loss of disc height is present all levels.  Prominent anterior vertebral spurring is present from T12 through L4.                                 Medications   sodium chloride 0.9% bolus 500 mL (500 mLs Intravenous New Bag 7/10/22 1046)   HYDROcodone-acetaminophen  mg per tablet 1 tablet (1 tablet Oral Given 7/10/22 0933)   orphenadrine injection 60 mg (60 mg Intravenous Given 7/10/22 0933)     Medical Decision Making:   Clinical Tests:   Lab Tests: Ordered and Reviewed  Radiological Study: Ordered and Reviewed  ED Management:  Pt presented after fall during the night with LBP. He was neuro intact but intoxicated. Labs c/w chronic alcoholism. CT Lspine showed mild L4 compression fx and CT pelvis neg. Pt counseled on the need to slow down his ETOH use and drink other fluids. He will make appt with his PCP next week. Return precautions discussed.                      Clinical Impression:   Final diagnoses:  [S32.040A] Compression fracture of L4 lumbar vertebra, closed, initial encounter (Primary)  [F10.129] Alcohol abuse with intoxication  [D69.6] Thrombocytopenia          ED Disposition Condition    Discharge Stable        ED Prescriptions     Medication Sig Dispense Start Date End Date Auth. Provider    HYDROcodone-acetaminophen (NORCO)  mg per tablet Take 1 tablet by mouth every 4 (four) hours as needed for Pain. 20 tablet 7/10/2022  Trey Lima Jr., MD        Follow-up Information     Follow up With Specialties Details Why Contact Info    Selvin Mathews MD Family Medicine Schedule an appointment as soon as possible for a visit in 3 days  [address]  [facility] [address]  [phone]             Trey Lima Jr., MD  07/10/22 5491

## 2022-07-10 NOTE — ED NOTES
Contacted pt's friend for transportation home. States that he will be here in an hour to transport pt home.

## 2022-07-24 ENCOUNTER — HOSPITAL ENCOUNTER (EMERGENCY)
Facility: HOSPITAL | Age: 72
Discharge: HOME HEALTH | End: 2022-07-24
Attending: INTERNAL MEDICINE
Payer: MEDICARE

## 2022-07-24 VITALS
OXYGEN SATURATION: 99 % | RESPIRATION RATE: 21 BRPM | TEMPERATURE: 98 F | SYSTOLIC BLOOD PRESSURE: 95 MMHG | HEIGHT: 68 IN | DIASTOLIC BLOOD PRESSURE: 50 MMHG | BODY MASS INDEX: 42.44 KG/M2 | HEART RATE: 88 BPM | WEIGHT: 280 LBS
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
## 2022-07-24 DIAGNOSIS — S09.90XA INJURY OF HEAD, INITIAL ENCOUNTER: Primary | ICD-10-CM

## 2022-07-24 DIAGNOSIS — S30.0XXA CONTUSION OF LOWER BACK, INITIAL ENCOUNTER: ICD-10-CM

## 2022-07-24 LAB
ANION GAP SERPL CALC-SCNC: 14 MMOL/L (ref 8–16)
BASOPHILS # BLD AUTO: 0.05 K/UL (ref 0–0.2)
BASOPHILS NFR BLD: 1.2 % (ref 0–1.9)
BUN SERPL-MCNC: 13 MG/DL (ref 8–23)
CALCIUM SERPL-MCNC: 8.5 MG/DL (ref 8.7–10.5)
CHLORIDE SERPL-SCNC: 109 MMOL/L (ref 95–110)
CO2 SERPL-SCNC: 20 MMOL/L (ref 23–29)
CREAT SERPL-MCNC: 0.7 MG/DL (ref 0.5–1.4)
DIFFERENTIAL METHOD: ABNORMAL
EOSINOPHIL # BLD AUTO: 0 K/UL (ref 0–0.5)
EOSINOPHIL NFR BLD: 0.9 % (ref 0–8)
ERYTHROCYTE [DISTWIDTH] IN BLOOD BY AUTOMATED COUNT: 16.9 % (ref 11.5–14.5)
EST. GFR  (AFRICAN AMERICAN): >60 ML/MIN/1.73 M^2
EST. GFR  (NON AFRICAN AMERICAN): >60 ML/MIN/1.73 M^2
GLUCOSE SERPL-MCNC: 130 MG/DL (ref 70–110)
HCT VFR BLD AUTO: 30 % (ref 40–54)
HGB BLD-MCNC: 10.2 G/DL (ref 14–18)
IMM GRANULOCYTES # BLD AUTO: 0.03 K/UL (ref 0–0.04)
IMM GRANULOCYTES NFR BLD AUTO: 0.7 % (ref 0–0.5)
LYMPHOCYTES # BLD AUTO: 0.4 K/UL (ref 1–4.8)
LYMPHOCYTES NFR BLD: 9.3 % (ref 18–48)
MCH RBC QN AUTO: 34.5 PG (ref 27–31)
MCHC RBC AUTO-ENTMCNC: 34 G/DL (ref 32–36)
MCV RBC AUTO: 101 FL (ref 82–98)
MONOCYTES # BLD AUTO: 0.6 K/UL (ref 0.3–1)
MONOCYTES NFR BLD: 14.5 % (ref 4–15)
NEUTROPHILS # BLD AUTO: 3.1 K/UL (ref 1.8–7.7)
NEUTROPHILS NFR BLD: 73.4 % (ref 38–73)
NRBC BLD-RTO: 1 /100 WBC
PLATELET # BLD AUTO: 41 K/UL (ref 150–450)
PMV BLD AUTO: 10.7 FL (ref 9.2–12.9)
POTASSIUM SERPL-SCNC: 4 MMOL/L (ref 3.5–5.1)
RBC # BLD AUTO: 2.96 M/UL (ref 4.6–6.2)
SODIUM SERPL-SCNC: 143 MMOL/L (ref 136–145)
WBC # BLD AUTO: 4.28 K/UL (ref 3.9–12.7)

## 2022-07-24 PROCEDURE — 70450 CT HEAD WITHOUT CONTRAST: ICD-10-PCS | Mod: 26,,, | Performed by: RADIOLOGY

## 2022-07-24 PROCEDURE — 72128 CT CHEST SPINE W/O DYE: CPT | Mod: 26,,, | Performed by: RADIOLOGY

## 2022-07-24 PROCEDURE — 85025 COMPLETE CBC W/AUTO DIFF WBC: CPT | Performed by: INTERNAL MEDICINE

## 2022-07-24 PROCEDURE — 36415 COLL VENOUS BLD VENIPUNCTURE: CPT | Performed by: INTERNAL MEDICINE

## 2022-07-24 PROCEDURE — 80048 BASIC METABOLIC PNL TOTAL CA: CPT | Performed by: INTERNAL MEDICINE

## 2022-07-24 PROCEDURE — 70450 CT HEAD/BRAIN W/O DYE: CPT | Mod: TC

## 2022-07-24 PROCEDURE — 93005 ELECTROCARDIOGRAM TRACING: CPT

## 2022-07-24 PROCEDURE — 99285 EMERGENCY DEPT VISIT HI MDM: CPT | Mod: 25

## 2022-07-24 PROCEDURE — 72125 CT CERVICAL SPINE WITHOUT CONTRAST: ICD-10-PCS | Mod: 26,,, | Performed by: RADIOLOGY

## 2022-07-24 PROCEDURE — 72128 CT THORACIC SPINE WITHOUT CONTRAST: ICD-10-PCS | Mod: 26,,, | Performed by: RADIOLOGY

## 2022-07-24 PROCEDURE — 70450 CT HEAD/BRAIN W/O DYE: CPT | Mod: 26,,, | Performed by: RADIOLOGY

## 2022-07-24 PROCEDURE — 72131 CT LUMBAR SPINE W/O DYE: CPT | Mod: TC

## 2022-07-24 PROCEDURE — 72125 CT NECK SPINE W/O DYE: CPT | Mod: TC

## 2022-07-24 PROCEDURE — 63600175 PHARM REV CODE 636 W HCPCS: Performed by: INTERNAL MEDICINE

## 2022-07-24 PROCEDURE — 96374 THER/PROPH/DIAG INJ IV PUSH: CPT

## 2022-07-24 PROCEDURE — 93010 ELECTROCARDIOGRAM REPORT: CPT | Mod: ,,, | Performed by: INTERNAL MEDICINE

## 2022-07-24 PROCEDURE — 72125 CT NECK SPINE W/O DYE: CPT | Mod: 26,,, | Performed by: RADIOLOGY

## 2022-07-24 PROCEDURE — 72131 CT LUMBAR SPINE W/O DYE: CPT | Mod: 26,,, | Performed by: RADIOLOGY

## 2022-07-24 PROCEDURE — 93010 EKG 12-LEAD: ICD-10-PCS | Mod: ,,, | Performed by: INTERNAL MEDICINE

## 2022-07-24 PROCEDURE — 72131 CT LUMBAR SPINE WITHOUT CONTRAST: ICD-10-PCS | Mod: 26,,, | Performed by: RADIOLOGY

## 2022-07-24 PROCEDURE — 72128 CT CHEST SPINE W/O DYE: CPT | Mod: TC

## 2022-07-24 RX ORDER — KETOROLAC TROMETHAMINE 30 MG/ML
15 INJECTION, SOLUTION INTRAMUSCULAR; INTRAVENOUS
Status: COMPLETED | OUTPATIENT
Start: 2022-07-24 | End: 2022-07-24

## 2022-07-24 RX ADMIN — KETOROLAC TROMETHAMINE 15 MG: 30 INJECTION, SOLUTION INTRAMUSCULAR; INTRAVENOUS at 01:07

## 2022-07-24 NOTE — ED NOTES
Spoke to pts friend, he states that he will be on the way and requested that the pt be ready and waiting. Advised friend that pt would be in a wheel chair in the waiting room, that when he arrived to notify the  and we would help get pt in the vehicle.

## 2022-07-24 NOTE — ED PROVIDER NOTES
Encounter Date: 7/24/2022       History     Chief Complaint   Patient presents with    Fall     Trip and fall PTA. Reports too weak to get up off floor. Denies LOC. Pt arrives via [facility].    Fatigue    Diarrhea    Back Pain     From fall     PATIENT BROUGHT IN BY EMS FOR FALL THAT OCCURRED 4 HOURS AGO.  Patient said he slipped going to the bathroom and fell down backwards and hit his head and lower back.  He states there was no loss of consciousness, incontinence urine or bowel, paralysis nausea vomiting.  EMS states the patient was able to get up he had no focal neurologic deficits scene, and he was ambulatory.    Patient was seen here 10 days ago from a fall at home.  He had an L4 20% compression fraction.  He was treated symptomatic him a referral but has not seen by his primary care doctor since that time.        Review of patient's allergies indicates:   Allergen Reactions    Grass pollen-kellie grass standard      Past Medical History:   Diagnosis Date    Herniated lumbar intervertebral disc     Hypertension      Past Surgical History:   Procedure Laterality Date    FINGER AMPUTATION Left 3/7/2022    Procedure: AMPUTATION, FINGER, partial left long;  Surgeon: Sarah Hernandez MD;  Location: [facility];  Service: Orthopedics;  Laterality: Left;     No family history on file.  Social History     Tobacco Use    Smoking status: Never Smoker    Smokeless tobacco: Never Used   Substance Use Topics    Alcohol use: Yes     Comment: 4 X's a week    Drug use: Never     Review of Systems   Constitutional: Negative for fever.   HENT: Negative for sore throat.    Respiratory: Negative for shortness of breath.    Cardiovascular: Negative for chest pain.   Gastrointestinal: Negative for nausea.   Genitourinary: Negative for dysuria.   Musculoskeletal: Negative for back pain.   Skin: Negative for rash.   Neurological: Negative for weakness.   Hematological: Does not bruise/bleed easily.       Physical Exam      Initial Vitals [07/24/22 1041]   BP Pulse Resp Temp SpO2   122/72 88 20 97.9 °F (36.6 °C) 99 %      MAP       --         Physical Exam    Nursing note and vitals reviewed.  Constitutional: He appears well-developed.   HENT:   Head: Normocephalic.   Eyes: Pupils are equal, round, and reactive to light.   Neck:   Normal range of motion.  Cardiovascular: Normal rate.   Pulmonary/Chest: Breath sounds normal.   Abdominal: Abdomen is soft.   Musculoskeletal:         General: Normal range of motion.        Arms:       Cervical back: Normal range of motion.     Neurological: He is alert. He has normal strength and normal reflexes. No cranial nerve deficit or sensory deficit. GCS eye subscore is 4. GCS verbal subscore is 5. GCS motor subscore is 6.   Skin: Skin is warm.   Chronic stasis dermatitis         ED Course   Procedures  Labs Reviewed   BASIC METABOLIC PANEL   CBC W/ AUTO DIFFERENTIAL        ECG Results          EKG 12-lead (Preliminary result)  Result time 07/24/22 11:14:05    ED Interpretation by Wilfred Johnson MD (07/24/22 11:14:05, [facility], Emergency Medicine)    Normal sinus rhythm rate of 95 and no acute ischemic changes, normal EKG                            Imaging Results          CT Lumbar Spine Without Contrast (Final result)  Result time 07/24/22 12:59:41    Final result by Marin Rodriguez MD (07/24/22 12:59:41)                 Impression:      1. Stable L4 vertebral body anterior wedge compression fracture.  2. Otherwise, no other acute fracture or traumatic malalignment in the lumbar spine.      Electronically signed by: Marin Rodriguez  Date:    07/24/2022  Time:    12:59             Narrative:    EXAMINATION:  CT LUMBAR SPINE WITHOUT CONTRAST    CLINICAL HISTORY:  FALL;    TECHNIQUE:  Low-dose axial, sagittal and coronal reformations are obtained through the lumbar spine.  Contrast was not administered.    COMPARISON:  07/10/2022    FINDINGS:  Mild levoconvex curvature of the lumbar  spine.Stable L4 vertebral body anterior wedge compression fracture.  Remaining vertebral body heights are maintained without evidence of an acute fracture or osseous destructive process.  Moderate multilevel disc degeneration with intervertebral disc space narrowing, vacuum disc phenomenon and degenerative endplate change at L3-4 through L5-S1.  Multilevel degenerative facet arthropathy.  Findings contribute to mild-to-moderate spinal canal or neural foraminal stenosis, not significantly changed since 07/10/2022.    The surrounding visualized paravertebral soft tissue structures demonstrate no pathology. Limited views of the abdomen demonstrate no significant abnormality.                               CT Thoracic Spine Without Contrast (Final result)  Result time 07/24/22 12:56:12    Final result by Marin Rodriguez MD (07/24/22 12:56:12)                 Impression:      1. No evidence of acute fracture or traumatic malalignment in the thoracic spine.  2. Multilevel degenerative change of the thoracic spine.  3. Small right and trace left pleural effusions with mild dependent atelectasis.      Electronically signed by: Marin Rodriguez  Date:    07/24/2022  Time:    12:56             Narrative:    EXAMINATION:  CT THORACIC SPINE WITHOUT CONTRAST    CLINICAL HISTORY:  FALL;    TECHNIQUE:  CT thoracic spine performed without contrast.  Coronal and sagittal reformats provided.    COMPARISON:  None    FINDINGS:  Alignment: Normal.    Vertebrae: Vertebral body heights are maintained without evidence of acute fracture or osseous destructive process.  Multilevel bridging anterior marginal osteophyte formation suggestive of diffuse idiopathic skeletal hyperostosis.    Discs: Multilevel mild-to-moderate intervertebral disc space narrowing with degenerative endplate change.    Degenerative findings:    C7-L1: No high-grade osseous spinal canal stenosis or neural foraminal narrowing.    Paraspinal muscles & soft tissues: Small right  and trace left pleural effusions with mild dependent atelectasis.  Calcification of the coronary arteries, aortic and mitral valves.                               CT Cervical Spine Without Contrast (Final result)  Result time 07/24/22 12:51:17    Final result by Marin Rodriguez MD (07/24/22 12:51:17)                 Impression:      1. No evidence of acute fracture or traumatic malalignment of the cervical spine.  2. Multilevel degenerative change of the cervical spine, not significantly changed since 02/21/2022.      Electronically signed by: Marin Rodriguez  Date:    07/24/2022  Time:    12:51             Narrative:    EXAMINATION:  CT CERVICAL SPINE WITHOUT CONTRAST    CLINICAL HISTORY:  FALL;    TECHNIQUE:  Low dose axial CT images through the cervical spine, with sagittal and coronal reformations.  Contrast was not administered.    COMPARISON:  02/21/2022    FINDINGS:  The vertebral bodies are normal in height and morphology without evidence of fracture or osseous destructive process.  Normal sagittal alignment is preserved.    Moderate multilevel degenerative change of the cervical spine with intervertebral disc space narrowing, and degenerative endplate change with posterior disc osteophyte complex formation, most pronounced at C5-6.  Multilevel facet arthropathy and uncovertebral joint spurring result in moderate to severe neural foraminal stenosis at C3-4 through C5-6.  Mild-to-moderate spinal canal stenosis, most pronounced at C5-6.    Limited evaluation of the intraspinal contents demonstrates no hematoma or mass.Paraspinal soft tissues exhibit no acute abnormalities.                               CT Head Without Contrast (Final result)  Result time 07/24/22 12:42:44    Final result by Marin Rodriguez MD (07/24/22 12:42:44)                 Impression:      1. No evidence of an acute intracranial abnormality.  2. Mild left parietal scalp soft tissue swelling.  No acute calvarial fracture.      Electronically signed  by: Marin Rodriguez  Date:    07/24/2022  Time:    12:42             Narrative:    EXAMINATION:  CT HEAD WITHOUT CONTRAST    CLINICAL HISTORY:  Head trauma, moderate-severe;    TECHNIQUE:  Low dose axial images were obtained through the head.  Coronal and sagittal reformations were also performed. Contrast was not administered.    COMPARISON:  02/21/2022    FINDINGS:  No acute intracranial hemorrhage.    Prominence of the ventricles and sulci compatible with mild generalized cerebral volume loss.  No hydrocephalus.    Scattered areas of hypoattenuation within the periventricular and deep white matter, nonspecific but probably reflecting a mild degree of chronic small vessel disease.   Gray-white matter differentiation is within normal limits without evidence of an acute major vascular territory infarct. No mass effect or midline shift.    Visualized portions of the orbits are normal. Paranasal sinuses are normal.  Mastoid air cells are clear. No acute calvarial fracture.  Mild left parietal scalp soft tissue swelling.                                 Medications   ketorolac injection 15 mg (has no administration in time range)     Medical Decision Making:   ED Management:  Patient has no fractures.  The lumbar compression fracture is old and on and stable.  Advised him will follow back up with a spine doctor referral referral and also his primary care doctor.             ED Course as of 07/24/22 1321   Sun Jul 24, 2022   1114 EKG 12-lead [PW]   1157 Does BC shows a white blood cell count is 4.2, hemoglobin 10, hematocrit is 30.  [PW]   1301 CT Head Without Contrast [PW]   1301 CT Head Without Contrast [PW]   1301 CT Cervical Spine Without Contrast [PW]   1301 CT Thoracic Spine Without Contrast [PW]   1308 CT Lumbar Spine Without Contrast [PW]   1319 Patient with no focal deficits. [PW]      ED Course User Index  [PW] Wilfred Johnson MD             Clinical Impression:   Final diagnoses:  [W10.8XXA] Fall (on) (from) other  stairs and steps, initial encounter  [S09.90XA] Injury of head, initial encounter (Primary)  [S30.0XXA] Contusion of lower back, initial encounter          ED Disposition Condition    Discharge Stable        ED Prescriptions     None        Follow-up Information     Follow up With Specialties Details Why Contact Info    Selvin Mathews MD Family Medicine In 2 days  [address]  [facility] [place] [address]  [phone]             Wilfred Johnson MD  07/24/22 1321       Wilfred Johnson MD  07/24/22 1321

## (undated) DEVICE — Device

## (undated) DEVICE — GLOVE BIOGEL ECLIPSE SZ 7

## (undated) DEVICE — SUT 4.0 ETHILON

## (undated) DEVICE — SUT MONOCRYL 3-0 PS-2 UND

## (undated) DEVICE — UNDERPAD ULTRASORB 300LB 30X36

## (undated) DEVICE — NDL SAFETY 22G X 1.5 ECLIPSE

## (undated) DEVICE — APPLICATOR CHLORAPREP ORN 26ML

## (undated) DEVICE — DRESSING XEROFORM FOIL PK 1X8

## (undated) DEVICE — SYR B-D DISP CONTROL 10CC100/C

## (undated) DEVICE — GAUZE SPONGE 4X4 12PLY

## (undated) DEVICE — BLADE SURG STAINLESS STEEL #15

## (undated) DEVICE — BANDAGE MATRIX HK LOOP 4IN 5YD

## (undated) DEVICE — TOURNIQUET SB QC DP 18X4IN

## (undated) DEVICE — GLOVE BIOGEL PI MICRO INDIC 7

## (undated) DEVICE — FORCEP STRAIGHT DISP

## (undated) DEVICE — SOL 9P NACL IRR PIC IL

## (undated) DEVICE — CORD BIPOLAR 12 FOOT